# Patient Record
Sex: FEMALE | Race: WHITE | Employment: OTHER | URBAN - METROPOLITAN AREA
[De-identification: names, ages, dates, MRNs, and addresses within clinical notes are randomized per-mention and may not be internally consistent; named-entity substitution may affect disease eponyms.]

---

## 2024-08-28 ENCOUNTER — TELEPHONE (OUTPATIENT)
Dept: INTERNAL MEDICINE CLINIC | Age: 74
End: 2024-08-28

## 2024-08-28 ENCOUNTER — HOSPITAL ENCOUNTER (INPATIENT)
Age: 74
LOS: 3 days | Discharge: HOME | End: 2024-08-31
Attending: INTERNAL MEDICINE | Admitting: INTERNAL MEDICINE
Payer: MEDICARE

## 2024-08-28 DIAGNOSIS — R06.02 SHORTNESS OF BREATH: Primary | ICD-10-CM

## 2024-08-28 DIAGNOSIS — R07.9 CHEST PAIN: ICD-10-CM

## 2024-08-28 DIAGNOSIS — I42.8 NONISCHEMIC CARDIOMYOPATHY (HCC): ICD-10-CM

## 2024-08-28 DIAGNOSIS — I48.0 PAROXYSMAL ATRIAL FIBRILLATION (HCC): ICD-10-CM

## 2024-08-28 PROBLEM — J44.1 COPD EXACERBATION (HCC): Status: ACTIVE | Noted: 2024-08-28

## 2024-08-28 PROBLEM — I21.4 NSTEMI (NON-ST ELEVATED MYOCARDIAL INFARCTION) (HCC): Status: ACTIVE | Noted: 2024-08-28

## 2024-08-28 LAB
ANION GAP SERPL CALCULATED.3IONS-SCNC: 12 MMOL/L (ref 3–16)
ANTI-XA UNFRAC HEPARIN: 0.64 IU/ML (ref 0.3–0.7)
BUN SERPL-MCNC: 15 MG/DL (ref 7–20)
CALCIUM SERPL-MCNC: 9.2 MG/DL (ref 8.3–10.6)
CHLORIDE SERPL-SCNC: 95 MMOL/L (ref 99–110)
CO2 SERPL-SCNC: 25 MMOL/L (ref 21–32)
CREAT SERPL-MCNC: 0.8 MG/DL (ref 0.6–1.2)
DEPRECATED RDW RBC AUTO: 13.4 % (ref 12.4–15.4)
EKG ATRIAL RATE: 88 BPM
EKG DIAGNOSIS: NORMAL
EKG P AXIS: 76 DEGREES
EKG P-R INTERVAL: 142 MS
EKG Q-T INTERVAL: 428 MS
EKG QRS DURATION: 80 MS
EKG QTC CALCULATION (BAZETT): 517 MS
EKG R AXIS: 99 DEGREES
EKG T AXIS: 131 DEGREES
EKG VENTRICULAR RATE: 88 BPM
GFR SERPLBLD CREATININE-BSD FMLA CKD-EPI: 78 ML/MIN/{1.73_M2}
GLUCOSE SERPL-MCNC: 140 MG/DL (ref 70–99)
HCT VFR BLD AUTO: 42 % (ref 36–48)
HGB BLD-MCNC: 14.2 G/DL (ref 12–16)
MCH RBC QN AUTO: 32.1 PG (ref 26–34)
MCHC RBC AUTO-ENTMCNC: 33.7 G/DL (ref 31–36)
MCV RBC AUTO: 95.3 FL (ref 80–100)
NT-PROBNP SERPL-MCNC: ABNORMAL PG/ML (ref 0–124)
PLATELET # BLD AUTO: 294 K/UL (ref 135–450)
PMV BLD AUTO: 8.3 FL (ref 5–10.5)
POTASSIUM SERPL-SCNC: 3.9 MMOL/L (ref 3.5–5.1)
RBC # BLD AUTO: 4.41 M/UL (ref 4–5.2)
SODIUM SERPL-SCNC: 132 MMOL/L (ref 136–145)
TROPONIN, HIGH SENSITIVITY: 507 NG/L (ref 0–14)
TROPONIN, HIGH SENSITIVITY: 513 NG/L (ref 0–14)
WBC # BLD AUTO: 10.2 K/UL (ref 4–11)

## 2024-08-28 PROCEDURE — 6370000000 HC RX 637 (ALT 250 FOR IP): Performed by: NURSE PRACTITIONER

## 2024-08-28 PROCEDURE — 94640 AIRWAY INHALATION TREATMENT: CPT

## 2024-08-28 PROCEDURE — 83880 ASSAY OF NATRIURETIC PEPTIDE: CPT

## 2024-08-28 PROCEDURE — 36415 COLL VENOUS BLD VENIPUNCTURE: CPT

## 2024-08-28 PROCEDURE — 2580000003 HC RX 258: Performed by: INTERNAL MEDICINE

## 2024-08-28 PROCEDURE — 93010 ELECTROCARDIOGRAM REPORT: CPT | Performed by: INTERNAL MEDICINE

## 2024-08-28 PROCEDURE — 84484 ASSAY OF TROPONIN QUANT: CPT

## 2024-08-28 PROCEDURE — 6370000000 HC RX 637 (ALT 250 FOR IP): Performed by: INTERNAL MEDICINE

## 2024-08-28 PROCEDURE — 94761 N-INVAS EAR/PLS OXIMETRY MLT: CPT

## 2024-08-28 PROCEDURE — 85520 HEPARIN ASSAY: CPT

## 2024-08-28 PROCEDURE — 6360000002 HC RX W HCPCS: Performed by: INTERNAL MEDICINE

## 2024-08-28 PROCEDURE — 93005 ELECTROCARDIOGRAM TRACING: CPT | Performed by: INTERNAL MEDICINE

## 2024-08-28 PROCEDURE — 2700000000 HC OXYGEN THERAPY PER DAY

## 2024-08-28 PROCEDURE — 80048 BASIC METABOLIC PNL TOTAL CA: CPT

## 2024-08-28 PROCEDURE — 1200000000 HC SEMI PRIVATE

## 2024-08-28 PROCEDURE — 85027 COMPLETE CBC AUTOMATED: CPT

## 2024-08-28 RX ORDER — SODIUM CHLORIDE 0.9 % (FLUSH) 0.9 %
5-40 SYRINGE (ML) INJECTION PRN
Status: DISCONTINUED | OUTPATIENT
Start: 2024-08-28 | End: 2024-08-31 | Stop reason: HOSPADM

## 2024-08-28 RX ORDER — HEPARIN SODIUM 1000 [USP'U]/ML
60 INJECTION, SOLUTION INTRAVENOUS; SUBCUTANEOUS PRN
Status: DISCONTINUED | OUTPATIENT
Start: 2024-08-28 | End: 2024-08-29

## 2024-08-28 RX ORDER — ACETAMINOPHEN 325 MG/1
650 TABLET ORAL EVERY 6 HOURS PRN
Status: DISCONTINUED | OUTPATIENT
Start: 2024-08-28 | End: 2024-08-31 | Stop reason: HOSPADM

## 2024-08-28 RX ORDER — IPRATROPIUM BROMIDE AND ALBUTEROL SULFATE 2.5; .5 MG/3ML; MG/3ML
1 SOLUTION RESPIRATORY (INHALATION)
Status: DISCONTINUED | OUTPATIENT
Start: 2024-08-28 | End: 2024-08-31 | Stop reason: HOSPADM

## 2024-08-28 RX ORDER — SODIUM CHLORIDE 9 MG/ML
INJECTION, SOLUTION INTRAVENOUS PRN
Status: DISCONTINUED | OUTPATIENT
Start: 2024-08-28 | End: 2024-08-31 | Stop reason: HOSPADM

## 2024-08-28 RX ORDER — HEPARIN SODIUM 10000 [USP'U]/100ML
650 INJECTION, SOLUTION INTRAVENOUS CONTINUOUS
Status: DISCONTINUED | OUTPATIENT
Start: 2024-08-28 | End: 2024-08-29

## 2024-08-28 RX ORDER — HEPARIN SODIUM 1000 [USP'U]/ML
30 INJECTION, SOLUTION INTRAVENOUS; SUBCUTANEOUS PRN
Status: DISCONTINUED | OUTPATIENT
Start: 2024-08-28 | End: 2024-08-29

## 2024-08-28 RX ORDER — ONDANSETRON 2 MG/ML
4 INJECTION INTRAMUSCULAR; INTRAVENOUS EVERY 6 HOURS PRN
Status: DISCONTINUED | OUTPATIENT
Start: 2024-08-28 | End: 2024-08-28

## 2024-08-28 RX ORDER — PROCHLORPERAZINE EDISYLATE 5 MG/ML
10 INJECTION INTRAMUSCULAR; INTRAVENOUS EVERY 6 HOURS PRN
Status: DISCONTINUED | OUTPATIENT
Start: 2024-08-28 | End: 2024-08-31 | Stop reason: HOSPADM

## 2024-08-28 RX ORDER — ONDANSETRON 4 MG/1
4 TABLET, ORALLY DISINTEGRATING ORAL EVERY 8 HOURS PRN
Status: DISCONTINUED | OUTPATIENT
Start: 2024-08-28 | End: 2024-08-28

## 2024-08-28 RX ORDER — ATORVASTATIN CALCIUM 80 MG/1
80 TABLET, FILM COATED ORAL NIGHTLY
Status: DISCONTINUED | OUTPATIENT
Start: 2024-08-28 | End: 2024-08-31 | Stop reason: HOSPADM

## 2024-08-28 RX ORDER — SODIUM CHLORIDE 0.9 % (FLUSH) 0.9 %
5-40 SYRINGE (ML) INJECTION EVERY 12 HOURS SCHEDULED
Status: DISCONTINUED | OUTPATIENT
Start: 2024-08-28 | End: 2024-08-31 | Stop reason: HOSPADM

## 2024-08-28 RX ORDER — ACETAMINOPHEN 650 MG/1
650 SUPPOSITORY RECTAL EVERY 6 HOURS PRN
Status: DISCONTINUED | OUTPATIENT
Start: 2024-08-28 | End: 2024-08-31 | Stop reason: HOSPADM

## 2024-08-28 RX ORDER — ASPIRIN 81 MG/1
81 TABLET, CHEWABLE ORAL DAILY
Status: DISCONTINUED | OUTPATIENT
Start: 2024-08-29 | End: 2024-08-31 | Stop reason: HOSPADM

## 2024-08-28 RX ORDER — POLYETHYLENE GLYCOL 3350 17 G/17G
17 POWDER, FOR SOLUTION ORAL DAILY PRN
Status: DISCONTINUED | OUTPATIENT
Start: 2024-08-28 | End: 2024-08-31 | Stop reason: HOSPADM

## 2024-08-28 RX ORDER — CYCLOBENZAPRINE HCL 10 MG
10 TABLET ORAL ONCE
Status: COMPLETED | OUTPATIENT
Start: 2024-08-29 | End: 2024-08-28

## 2024-08-28 RX ADMIN — IPRATROPIUM BROMIDE AND ALBUTEROL SULFATE 1 DOSE: 2.5; .5 SOLUTION RESPIRATORY (INHALATION) at 19:45

## 2024-08-28 RX ADMIN — Medication 10 ML: at 19:42

## 2024-08-28 RX ADMIN — ATORVASTATIN CALCIUM 80 MG: 80 TABLET, FILM COATED ORAL at 19:42

## 2024-08-28 RX ADMIN — HEPARIN SODIUM 560 UNITS/HR: 10000 INJECTION, SOLUTION INTRAVENOUS at 20:27

## 2024-08-28 RX ADMIN — CYCLOBENZAPRINE 10 MG: 10 TABLET, FILM COATED ORAL at 23:51

## 2024-08-28 NOTE — TELEPHONE ENCOUNTER
----- Message from Dr. Andi Serna MD sent at 8/28/2024  8:58 AM EDT -----  Contact: [phone]  Chest xray pa,lat   Shortness of breath  ----- Message -----  From: Jordana Velasquez  Sent: 8/28/2024   8:33 AM EDT  To: Andi Serna MD    Patient called life squad this morning do being unable to breath. Pt over exerted herself yesterday walking outside. Life squad told her she will need oxygen and  xray's. Patient called office while stacey was there asking to come into office today instead of being taken to the ER. I spoke with stacey,she has shortness of breath 96% there were doing a breathing treatment and offered to take her to the ER. I did make a future appt for patient.

## 2024-08-28 NOTE — CONSULTS
Consult Placed Left Message    Who: [facility]  Date: 08/28/24  Time: 1923     Electronically signed by Marisel Eid RN on 8/28/2024 at 7:22 PM

## 2024-08-28 NOTE — H&P
Hospital Medicine History & Physical      Date of Admission: 8/28/2024    Date of Service:  Pt seen/examined on 8/28/2024     [x]Admitted to Inpatient with expected LOS greater than two midnights due to medical therapy.  []Placed in Observation status.    Chief Admission Complaint:  SOB    Presenting Admission History:      73 y.o. female who presented to [facility] with SOB.  PMHx significant for COPD. She reports symptoms started on 8/27 while at [facility] for the [facility].  She had abrupt onset of SOB and MORSE. She was taken to the medical tent, provided some supplemental O2 and symptoms gradually improved. The next morning, she began to experience worsening symptoms again. She denies any chest pain but notes some discomfort in her left neck.  She was initially evaluated at [facility] ED. EKG was stable. CXR clear. Troponins (TnI) were elevated at 3. D-dimer and BNP elevated. WBC 19K. CTPE was negative for PE or Pneumonia but showed an 11.8 mm ANATOLY Pulmonary nodule and trace right pleural effusion, bronchiectasis but no definite infiltrate. There was concern for NSTEMI and possible COPD Exacerbation. She was started on Heparin drip, Levaquin, Solumedrol and Duonebs. She was transferred to [facility] for further evaluation.     Assessment/Plan:      NSTEMI: Symptoms somewhat atypical (SOB, MORSE, left neck pain) but Troponins are significantly elevated. Continue serial troponins. Continue Heparin drip. Cardiology consulted.     Possible COPD Exacerbation: Received dose of Levaquin and Solumedrol in ED. No evidence for Pneumonia. Continue Duonebs but suspicion low enough to hold off on further Abx and steroids pending course.     Leukocytosis: 19K this morning at [facility]. Steroids were given afterwards. WBC appears normal at 10K upon transfer. Suspect this could be stress response.      Pulmonary Nodule: 11.8 mm ANATOLY Pulmonary nodule, will need ongoing follow-up.       Discussed management and the need  Provider   fluticasone-umeclidin-vilant (TRELEGY ELLIPTA) 200-62.5-25 MCG/ACT AEPB inhaler INHALE 1 PUFF ONCE DAILY 6/3/24  Yes Andi Serna MD   albuterol sulfate HFA (PROVENTIL;VENTOLIN;PROAIR) 108 (90 Base) MCG/ACT inhaler INHALE 2 PUFFS BY MOUTH EVERY 6 HOURS AS NEEDED FOR WHEEZING 11/29/23  Yes Andi Serna MD       Labs: Personally reviewed and interpreted for clinical significance.   No results for input(s): \"WBC\", \"HGB\", \"HCT\", \"PLT\" in the last 72 hours.  No results for input(s): \"NA\", \"K\", \"CL\", \"CO2\", \"BUN\", \"CREATININE\", \"CALCIUM\", \"MG\", \"PHOS\" in the last 72 hours.  No results for input(s): \"PROBNP\", \"TROPHS\" in the last 72 hours.  No results for input(s): \"LABA1C\" in the last 72 hours.  No results for input(s): \"AST\", \"ALT\", \"BILIDIR\", \"BILITOT\", \"ALKPHOS\" in the last 72 hours.  No results for input(s): \"INR\", \"LACTA\", \"TSH\" in the last 72 hours.     Ravi Hand MD

## 2024-08-28 NOTE — PROGRESS NOTES
4 Eyes Skin Assessment     NAME:  Vero \"Kelly\"Augusto  YOB: 1950  MEDICAL RECORD NUMBER:  [id number]    The patient is being assessed for  Admission    I agree that at least one RN has performed a thorough Head to Toe Skin Assessment on the patient. ALL assessment sites listed below have been assessed.      Areas assessed by both nurses:    Head, Face, Ears, Shoulders, Back, Chest, Arms, Elbows, Hands, Sacrum. Buttock, Coccyx, Ischium, Legs. Feet and Heels, and Under Medical Devices         Does the Patient have a Wound? No noted wound(s)       Alcon Prevention initiated by RN: No  Wound Care Orders initiated by RN: No    Pressure Injury (Stage 3,4, Unstageable, DTI, NWPT, and Complex wounds) if present, place Wound referral order by RN under : No    New Ostomies, if present place, Ostomy referral order under : No     Nurse 1 eSignature: Electronically signed by Shruthi Montero RN on 8/28/24 at 6:32 PM EDT    **SHARE this note so that the co-signing nurse can place an eSignature**    Nurse 2 eSignature: {Esignature:[id number]}

## 2024-08-28 NOTE — CONSULTS
Pharmacy to manage Heparin - contact for questions.    Heparin 60 units/kg IV x 1 (max 4,000 units), then 12 units/kg/hr (recommended max initial rate 1,000 units/hr). Adjust infusion rate based off anti-Xa results below.     anti-Xa < 0.1    Heparin 60 units/kg bolus  Increase infusion by 4 units/kg/hr  anti-Xa 0.1-0.29 Heparin 30 units/kg bolus Increase infusion by 2 units/kg/hr  anti-Xa 0.3-0.7    No bolus No change   anti-Xa 0.71-0.8   No bolus Decrease infusion by 1 units/kg/hr  anti-Xa 0.81-0.99    No bolus Decrease infusion by 2 units/kg/hr  anti-Xa 1 or more     Hold heparin for 1 hour Decrease infusion by 3 units/kg/hr    Obtain anti-Xa 6 hours after bolus and 6 hours after any dose change until two consecutive therapeutic anti-Xa are achieved- then daily.   Patient came from [facility] ED where she was started on heparin, per Dr Yazan galeana had been off a few hours.  AntiXa drawn level 0.64 @ 1925.  Drip started @ 560 units/hour.  Hebert Duque, PharmD 8/28/2024 7:57 PM    8/29/2024 at 0250  Anti-Xa Unfrac Heparin   0.21   IU/mL  - Heparin _1400_ units IVP bolus and then increase Heparin infusion to _650_ units/hr.   - Recheck Anti-Xa in 6 hours at 1000  Bony Pepper, Pharm D.8/29/2024 3:42 AM      8/29/2024  anti-Xa level = 0.41 IU/mL at 0951  Continue Heparin infusion rate at 650 units/hr   Recheck level in 6 hours at 1600  Neela Romero, PharmD  8/29/2024 10:16 AM

## 2024-08-29 ENCOUNTER — APPOINTMENT (OUTPATIENT)
Age: 74
End: 2024-08-29
Attending: INTERNAL MEDICINE
Payer: MEDICARE

## 2024-08-29 LAB
ANION GAP SERPL CALCULATED.3IONS-SCNC: 11 MMOL/L (ref 3–16)
ANTI-XA UNFRAC HEPARIN: 0.21 IU/ML (ref 0.3–0.7)
ANTI-XA UNFRAC HEPARIN: 0.41 IU/ML (ref 0.3–0.7)
BUN SERPL-MCNC: 17 MG/DL (ref 7–20)
CALCIUM SERPL-MCNC: 9.1 MG/DL (ref 8.3–10.6)
CHLORIDE SERPL-SCNC: 98 MMOL/L (ref 99–110)
CO2 SERPL-SCNC: 27 MMOL/L (ref 21–32)
CREAT SERPL-MCNC: 0.8 MG/DL (ref 0.6–1.2)
DEPRECATED RDW RBC AUTO: 13.4 % (ref 12.4–15.4)
ECHO AO ROOT DIAM: 2.7 CM
ECHO AO ROOT INDEX: 1.84 CM/M2
ECHO AV AREA PEAK VELOCITY: 2.7 CM2
ECHO AV AREA/BSA PEAK VELOCITY: 1.8 CM2/M2
ECHO AV PEAK GRADIENT: 5 MMHG
ECHO AV PEAK VELOCITY: 1.1 M/S
ECHO AV VELOCITY RATIO: 1
ECHO BSA: 1.46 M2
ECHO BSA: 1.46 M2
ECHO EST RA PRESSURE: 3 MMHG
ECHO IVC PROX: 1.4 CM
ECHO LA AREA 2C: 14.9 CM2
ECHO LA AREA 4C: 16.2 CM2
ECHO LA DIAMETER INDEX: 2.31 CM/M2
ECHO LA DIAMETER: 3.4 CM
ECHO LA MAJOR AXIS: 5 CM
ECHO LA MINOR AXIS: 5.1 CM
ECHO LA TO AORTIC ROOT RATIO: 1.26
ECHO LA VOL BP: 39 ML (ref 22–52)
ECHO LA VOL MOD A2C: 36 ML (ref 22–52)
ECHO LA VOL MOD A4C: 42 ML (ref 22–52)
ECHO LA VOL/BSA BIPLANE: 27 ML/M2 (ref 16–34)
ECHO LA VOLUME INDEX MOD A2C: 24 ML/M2 (ref 16–34)
ECHO LA VOLUME INDEX MOD A4C: 29 ML/M2 (ref 16–34)
ECHO LV E' LATERAL VELOCITY: 8 CM/S
ECHO LV E' SEPTAL VELOCITY: 7 CM/S
ECHO LV EDV A2C: 58 ML
ECHO LV EDV A4C: 74 ML
ECHO LV EDV INDEX A4C: 50 ML/M2
ECHO LV EDV NDEX A2C: 39 ML/M2
ECHO LV EF PHYSICIAN: 25 %
ECHO LV EJECTION FRACTION A2C: 37 %
ECHO LV EJECTION FRACTION A4C: 30 %
ECHO LV EJECTION FRACTION BIPLANE: 33 % (ref 55–100)
ECHO LV ESV A2C: 37 ML
ECHO LV ESV A4C: 52 ML
ECHO LV ESV INDEX A2C: 25 ML/M2
ECHO LV ESV INDEX A4C: 35 ML/M2
ECHO LV FRACTIONAL SHORTENING: 21 % (ref 28–44)
ECHO LV GLOBAL LONGITUDINAL STRAIN (GLS): -9.1 %
ECHO LV INTERNAL DIMENSION DIASTOLE INDEX: 2.65 CM/M2
ECHO LV INTERNAL DIMENSION DIASTOLIC: 3.9 CM (ref 3.9–5.3)
ECHO LV INTERNAL DIMENSION SYSTOLIC INDEX: 2.11 CM/M2
ECHO LV INTERNAL DIMENSION SYSTOLIC: 3.1 CM
ECHO LV ISOVOLUMETRIC RELAXATION TIME (IVRT): 82 MS
ECHO LV IVSD: 0.7 CM (ref 0.6–0.9)
ECHO LV MASS 2D: 75.1 G (ref 67–162)
ECHO LV MASS INDEX 2D: 51.1 G/M2 (ref 43–95)
ECHO LV POSTERIOR WALL DIASTOLIC: 0.7 CM (ref 0.6–0.9)
ECHO LV RELATIVE WALL THICKNESS RATIO: 0.36
ECHO LVOT AREA: 2.8 CM2
ECHO LVOT DIAM: 1.9 CM
ECHO LVOT MEAN GRADIENT: 2 MMHG
ECHO LVOT PEAK GRADIENT: 5 MMHG
ECHO LVOT PEAK VELOCITY: 1.1 M/S
ECHO LVOT STROKE VOLUME INDEX: 38.4 ML/M2
ECHO LVOT SV: 56.4 ML
ECHO LVOT VTI: 19.9 CM
ECHO MV A VELOCITY: 0.79 M/S
ECHO MV E DECELERATION TIME (DT): 174 MS
ECHO MV E VELOCITY: 0.87 M/S
ECHO MV E/A RATIO: 1.1
ECHO MV E/E' LATERAL: 10.88
ECHO MV E/E' RATIO (AVERAGED): 11.65
ECHO MV E/E' SEPTAL: 12.43
ECHO RA AREA 4C: 10.5 CM2
ECHO RA END SYSTOLIC VOLUME APICAL 4 CHAMBER INDEX BSA: 18 ML/M2
ECHO RA VOLUME: 26 ML
ECHO RV FREE WALL PEAK S': 11 CM/S
ECHO RV TAPSE: 1.8 CM (ref 1.7–?)
EKG ATRIAL RATE: 78 BPM
EKG DIAGNOSIS: NORMAL
EKG P AXIS: 79 DEGREES
EKG P-R INTERVAL: 134 MS
EKG Q-T INTERVAL: 460 MS
EKG QRS DURATION: 82 MS
EKG QTC CALCULATION (BAZETT): 524 MS
EKG R AXIS: 114 DEGREES
EKG T AXIS: 229 DEGREES
EKG VENTRICULAR RATE: 78 BPM
GFR SERPLBLD CREATININE-BSD FMLA CKD-EPI: 78 ML/MIN/{1.73_M2}
GLUCOSE SERPL-MCNC: 126 MG/DL (ref 70–99)
HCT VFR BLD AUTO: 39.7 % (ref 36–48)
HGB BLD-MCNC: 13.3 G/DL (ref 12–16)
MCH RBC QN AUTO: 31.7 PG (ref 26–34)
MCHC RBC AUTO-ENTMCNC: 33.5 G/DL (ref 31–36)
MCV RBC AUTO: 94.6 FL (ref 80–100)
PLATELET # BLD AUTO: 266 K/UL (ref 135–450)
PMV BLD AUTO: 8.5 FL (ref 5–10.5)
POC ACT LR: 290 SEC
POTASSIUM SERPL-SCNC: 4.1 MMOL/L (ref 3.5–5.1)
RBC # BLD AUTO: 4.19 M/UL (ref 4–5.2)
SODIUM SERPL-SCNC: 136 MMOL/L (ref 136–145)
WBC # BLD AUTO: 15.9 K/UL (ref 4–11)

## 2024-08-29 PROCEDURE — 94761 N-INVAS EAR/PLS OXIMETRY MLT: CPT

## 2024-08-29 PROCEDURE — 93306 TTE W/DOPPLER COMPLETE: CPT | Performed by: INTERNAL MEDICINE

## 2024-08-29 PROCEDURE — 4A023N7 MEASUREMENT OF CARDIAC SAMPLING AND PRESSURE, LEFT HEART, PERCUTANEOUS APPROACH: ICD-10-PCS | Performed by: INTERNAL MEDICINE

## 2024-08-29 PROCEDURE — 85027 COMPLETE CBC AUTOMATED: CPT

## 2024-08-29 PROCEDURE — 93005 ELECTROCARDIOGRAM TRACING: CPT | Performed by: INTERNAL MEDICINE

## 2024-08-29 PROCEDURE — 99223 1ST HOSP IP/OBS HIGH 75: CPT | Performed by: INTERNAL MEDICINE

## 2024-08-29 PROCEDURE — 2580000003 HC RX 258: Performed by: INTERNAL MEDICINE

## 2024-08-29 PROCEDURE — 85520 HEPARIN ASSAY: CPT

## 2024-08-29 PROCEDURE — 93010 ELECTROCARDIOGRAM REPORT: CPT | Performed by: INTERNAL MEDICINE

## 2024-08-29 PROCEDURE — C1894 INTRO/SHEATH, NON-LASER: HCPCS | Performed by: INTERNAL MEDICINE

## 2024-08-29 PROCEDURE — 80048 BASIC METABOLIC PNL TOTAL CA: CPT

## 2024-08-29 PROCEDURE — 6360000002 HC RX W HCPCS: Performed by: INTERNAL MEDICINE

## 2024-08-29 PROCEDURE — C1769 GUIDE WIRE: HCPCS | Performed by: INTERNAL MEDICINE

## 2024-08-29 PROCEDURE — 99152 MOD SED SAME PHYS/QHP 5/>YRS: CPT | Performed by: INTERNAL MEDICINE

## 2024-08-29 PROCEDURE — 6370000000 HC RX 637 (ALT 250 FOR IP): Performed by: INTERNAL MEDICINE

## 2024-08-29 PROCEDURE — 85347 COAGULATION TIME ACTIVATED: CPT

## 2024-08-29 PROCEDURE — 94640 AIRWAY INHALATION TREATMENT: CPT

## 2024-08-29 PROCEDURE — 93458 L HRT ARTERY/VENTRICLE ANGIO: CPT | Performed by: INTERNAL MEDICINE

## 2024-08-29 PROCEDURE — 76376 3D RENDER W/INTRP POSTPROCES: CPT

## 2024-08-29 PROCEDURE — B2111ZZ FLUOROSCOPY OF MULTIPLE CORONARY ARTERIES USING LOW OSMOLAR CONTRAST: ICD-10-PCS | Performed by: INTERNAL MEDICINE

## 2024-08-29 PROCEDURE — 76376 3D RENDER W/INTRP POSTPROCES: CPT | Performed by: INTERNAL MEDICINE

## 2024-08-29 PROCEDURE — 2700000000 HC OXYGEN THERAPY PER DAY

## 2024-08-29 PROCEDURE — 2709999900 HC NON-CHARGEABLE SUPPLY: Performed by: INTERNAL MEDICINE

## 2024-08-29 PROCEDURE — 2500000003 HC RX 250 WO HCPCS: Performed by: INTERNAL MEDICINE

## 2024-08-29 PROCEDURE — 93356 MYOCRD STRAIN IMG SPCKL TRCK: CPT

## 2024-08-29 PROCEDURE — B2151ZZ FLUOROSCOPY OF LEFT HEART USING LOW OSMOLAR CONTRAST: ICD-10-PCS | Performed by: INTERNAL MEDICINE

## 2024-08-29 PROCEDURE — 6360000004 HC RX CONTRAST MEDICATION: Performed by: INTERNAL MEDICINE

## 2024-08-29 PROCEDURE — 1200000000 HC SEMI PRIVATE

## 2024-08-29 PROCEDURE — 36415 COLL VENOUS BLD VENIPUNCTURE: CPT

## 2024-08-29 PROCEDURE — 93356 MYOCRD STRAIN IMG SPCKL TRCK: CPT | Performed by: INTERNAL MEDICINE

## 2024-08-29 RX ORDER — SODIUM CHLORIDE 9 MG/ML
INJECTION, SOLUTION INTRAVENOUS PRN
Status: DISCONTINUED | OUTPATIENT
Start: 2024-08-29 | End: 2024-08-29 | Stop reason: HOSPADM

## 2024-08-29 RX ORDER — SODIUM CHLORIDE 0.9 % (FLUSH) 0.9 %
5-40 SYRINGE (ML) INJECTION EVERY 12 HOURS SCHEDULED
Status: DISCONTINUED | OUTPATIENT
Start: 2024-08-29 | End: 2024-08-29 | Stop reason: HOSPADM

## 2024-08-29 RX ORDER — IOPAMIDOL 755 MG/ML
INJECTION, SOLUTION INTRAVASCULAR PRN
Status: DISCONTINUED | OUTPATIENT
Start: 2024-08-29 | End: 2024-08-29 | Stop reason: HOSPADM

## 2024-08-29 RX ORDER — ACETAMINOPHEN 325 MG/1
650 TABLET ORAL EVERY 4 HOURS PRN
Status: DISCONTINUED | OUTPATIENT
Start: 2024-08-29 | End: 2024-08-31 | Stop reason: HOSPADM

## 2024-08-29 RX ORDER — HEPARIN SODIUM 1000 [USP'U]/ML
1400 INJECTION, SOLUTION INTRAVENOUS; SUBCUTANEOUS ONCE
Status: COMPLETED | OUTPATIENT
Start: 2024-08-29 | End: 2024-08-29

## 2024-08-29 RX ORDER — SODIUM CHLORIDE 0.9 % (FLUSH) 0.9 %
5-40 SYRINGE (ML) INJECTION PRN
Status: DISCONTINUED | OUTPATIENT
Start: 2024-08-29 | End: 2024-08-31 | Stop reason: HOSPADM

## 2024-08-29 RX ORDER — MIDAZOLAM HYDROCHLORIDE 1 MG/ML
INJECTION INTRAMUSCULAR; INTRAVENOUS PRN
Status: DISCONTINUED | OUTPATIENT
Start: 2024-08-29 | End: 2024-08-29 | Stop reason: HOSPADM

## 2024-08-29 RX ORDER — SODIUM CHLORIDE 9 MG/ML
INJECTION, SOLUTION INTRAVENOUS PRN
Status: DISCONTINUED | OUTPATIENT
Start: 2024-08-29 | End: 2024-08-31 | Stop reason: HOSPADM

## 2024-08-29 RX ORDER — HEPARIN SODIUM 1000 [USP'U]/ML
INJECTION, SOLUTION INTRAVENOUS; SUBCUTANEOUS PRN
Status: DISCONTINUED | OUTPATIENT
Start: 2024-08-29 | End: 2024-08-29 | Stop reason: HOSPADM

## 2024-08-29 RX ORDER — CARVEDILOL 3.12 MG/1
3.12 TABLET ORAL 2 TIMES DAILY WITH MEALS
Status: DISCONTINUED | OUTPATIENT
Start: 2024-08-29 | End: 2024-08-31 | Stop reason: HOSPADM

## 2024-08-29 RX ORDER — ASPIRIN 325 MG
325 TABLET ORAL ONCE
Status: COMPLETED | OUTPATIENT
Start: 2024-08-29 | End: 2024-08-29

## 2024-08-29 RX ORDER — SODIUM CHLORIDE 0.9 % (FLUSH) 0.9 %
5-40 SYRINGE (ML) INJECTION EVERY 12 HOURS SCHEDULED
Status: DISCONTINUED | OUTPATIENT
Start: 2024-08-29 | End: 2024-08-29

## 2024-08-29 RX ORDER — LISINOPRIL 2.5 MG/1
2.5 TABLET ORAL DAILY
Status: DISCONTINUED | OUTPATIENT
Start: 2024-08-29 | End: 2024-08-29

## 2024-08-29 RX ORDER — ONDANSETRON 2 MG/ML
4 INJECTION INTRAMUSCULAR; INTRAVENOUS EVERY 6 HOURS PRN
Status: DISCONTINUED | OUTPATIENT
Start: 2024-08-29 | End: 2024-08-29 | Stop reason: HOSPADM

## 2024-08-29 RX ORDER — FENTANYL CITRATE 50 UG/ML
INJECTION, SOLUTION INTRAMUSCULAR; INTRAVENOUS PRN
Status: DISCONTINUED | OUTPATIENT
Start: 2024-08-29 | End: 2024-08-29 | Stop reason: HOSPADM

## 2024-08-29 RX ORDER — NICARDIPINE HYDROCHLORIDE 2.5 MG/ML
INJECTION INTRAVENOUS PRN
Status: DISCONTINUED | OUTPATIENT
Start: 2024-08-29 | End: 2024-08-29 | Stop reason: HOSPADM

## 2024-08-29 RX ORDER — LISINOPRIL 5 MG/1
2.5 TABLET ORAL DAILY
Status: DISCONTINUED | OUTPATIENT
Start: 2024-08-29 | End: 2024-08-31 | Stop reason: HOSPADM

## 2024-08-29 RX ORDER — CARVEDILOL 6.25 MG/1
3.12 TABLET ORAL 2 TIMES DAILY WITH MEALS
Status: DISCONTINUED | OUTPATIENT
Start: 2024-08-29 | End: 2024-08-29

## 2024-08-29 RX ORDER — SODIUM CHLORIDE 0.9 % (FLUSH) 0.9 %
5-40 SYRINGE (ML) INJECTION PRN
Status: DISCONTINUED | OUTPATIENT
Start: 2024-08-29 | End: 2024-08-29 | Stop reason: HOSPADM

## 2024-08-29 RX ORDER — LORAZEPAM 0.5 MG/1
0.5 TABLET ORAL
Status: DISCONTINUED | OUTPATIENT
Start: 2024-08-29 | End: 2024-08-29 | Stop reason: HOSPADM

## 2024-08-29 RX ADMIN — IPRATROPIUM BROMIDE AND ALBUTEROL SULFATE 1 DOSE: 2.5; .5 SOLUTION RESPIRATORY (INHALATION) at 07:47

## 2024-08-29 RX ADMIN — CARVEDILOL 3.12 MG: 3.12 TABLET, FILM COATED ORAL at 18:37

## 2024-08-29 RX ADMIN — ATORVASTATIN CALCIUM 80 MG: 80 TABLET, FILM COATED ORAL at 21:32

## 2024-08-29 RX ADMIN — LISINOPRIL 2.5 MG: 2.5 TABLET ORAL at 14:06

## 2024-08-29 RX ADMIN — HEPARIN SODIUM 1400 UNITS: 1000 INJECTION INTRAVENOUS; SUBCUTANEOUS at 04:00

## 2024-08-29 RX ADMIN — IPRATROPIUM BROMIDE AND ALBUTEROL SULFATE 1 DOSE: 2.5; .5 SOLUTION RESPIRATORY (INHALATION) at 21:16

## 2024-08-29 RX ADMIN — Medication 10 ML: at 21:32

## 2024-08-29 RX ADMIN — HEPARIN SODIUM 1400 UNITS: 1000 INJECTION INTRAVENOUS; SUBCUTANEOUS at 03:59

## 2024-08-29 RX ADMIN — Medication 10 ML: at 10:59

## 2024-08-29 RX ADMIN — ASPIRIN 325 MG: 325 TABLET ORAL at 09:40

## 2024-08-29 NOTE — CARE COORDINATION
Case Management Assessment  Initial Evaluation    Date/Time of Evaluation: 8/29/2024 9:58 AM  Assessment Completed by: Lluvia Mishra RN    If patient is discharged prior to next notation, then this note serves as note for discharge by case management.    Patient Name: Vero \"Kelly\"Augusto                   YOB: 1950  Diagnosis: NSTEMI (non-ST elevated myocardial infarction) (HCC) [I21.4]  COPD exacerbation (HCC) [J44.1]                   Date / Time: 8/28/2024  5:53 PM    Patient Admission Status: Inpatient   Readmission Risk (Low < 19, Mod (19-27), High > 27): Readmission Risk Score: 4.6    Current PCP: Andi Serna MD  PCP verified by CM? Yes    Chart Reviewed: Yes      History Provided by: Patient  Patient Orientation: Alert and Oriented, Person, Place, Situation    Patient Cognition: Alert    Hospitalization in the last 30 days (Readmission):  No    If yes, Readmission Assessment in CM Navigator will be completed.    Advance Directives:      Code Status: Full Code   Patient's Primary Decision Maker is: Legal Next of Kin      Discharge Planning:    Patient lives with: Spouse/Significant Other Type of Home: Trailer/Mobile Home  Primary Care Giver: Self  Patient Support Systems include: Spouse/Significant Other   Current Financial resources: Other (Comment) (commercial)  Current community resources: None  Current services prior to admission: None            Current DME:              Type of Home Care services:  None    ADLS  Prior functional level: Independent in ADLs/IADLs  Current functional level: Independent in ADLs/IADLs    PT AM-PAC:   /24  OT AM-PAC:   /24    Family can provide assistance at DC: Yes  Would you like Case Management to discuss the discharge plan with any other family members/significant others, and if so, who? Yes ()  Plans to Return to Present Housing: Yes  Other Identified Issues/Barriers to RETURNING to current housing:   Potential Assistance

## 2024-08-29 NOTE — PROGRESS NOTES
Brief Pre-Op Note/Sedation Assessment      Vero \"Kelly\" Augusto  1950  [id number]  8:56 AM    Planned Procedure: Cardiac Catheterization Procedure  Post Procedure Plan: Return to same level of care  Consent: I have discussed with the patient and/or the patient representative the indication, alternatives, and the possible risks and/or complications of the planned procedure and the anesthesia methods. The patient and/or patient representative appear to understand and agree to proceed.    DISCUSSION OF CARDIAC CATHETERIZATION PROCEDURES: The procedures, indications, risks and alternatives have been discussed with the patient and, as appropriate, with the patient's guardian . Risks discussed included, but are not limited to, bleeding, development of blood clots/emboli, damage to blood vessels, renal failure, malignant cardiac arrhythmias, stroke, heart attack, emergent coronary bypass surgery, death, dye allergy.  The patient (and guardian as appropriate) expressed understanding of the aforementioned and wished to proceed.        Chief Complaint:   NSTEMI  Dyspnea      Indications for Cath Procedure:  Presentation:  ACS > 24 hrs  2.  Anginal Classification within 2 weeks:  CCS IV - Inability to perform any activity without angina or angina at rest, i.e., severe limitation  3.  Angina Symptoms Assessment:  Atypical Chest Pain  4.  Heart Failure Class within last 2 weeks:  Yes:  Heart Failure Type: Systolic Severity:  Class IV - Symptoms of HF at rest  5.  Cardiovascular Instability:  No    Prior Ischemic Workup/Eval:  Pre-Procedural Medications: Yes: Aspirin and STATIN  2.   Stress Test Completed?  No    Does Patient need surgery?  Cath Valve Surgery:  No    Pre-Procedure Medical History:  Vital Signs:  /67   Pulse 89   Temp 97.8 °F (36.6 °C) (Oral)   Resp 18   Ht 1.626 m (5' 4\")   Wt 46.2 kg (101 lb 14.4 oz)   SpO2 98%   BMI 17.49 kg/m²     Allergies:  No Known Allergies  Medications:     Current Facility-Administered Medications   Medication Dose Route Frequency Provider Last Rate Last Admin    sodium chloride flush 0.9 % injection 5-40 mL  5-40 mL IntraVENous 2 times per day Ravi Hand MD   10 mL at 08/28/24 1942    sodium chloride flush 0.9 % injection 5-40 mL  5-40 mL IntraVENous PRN Ravi Hand MD        0.9 % sodium chloride infusion   IntraVENous PRN Ravi Hand MD        acetaminophen (TYLENOL) tablet 650 mg  650 mg Oral Q6H PRN Ravi Hand MD        Or    acetaminophen (TYLENOL) suppository 650 mg  650 mg Rectal Q6H PRN Ravi Hand MD        polyethylene glycol (GLYCOLAX) packet 17 g  17 g Oral Daily PRN Ravi Hand MD        aspirin chewable tablet 81 mg  81 mg Oral Daily Ravi Hand MD        atorvastatin (LIPITOR) tablet 80 mg  80 mg Oral Nightly Ravi Hand MD   80 mg at 08/28/24 1942    ipratropium 0.5 mg-albuterol 2.5 mg (DUONEB) nebulizer solution 1 Dose  1 Dose Inhalation Q4H WA RT Ravi Hand MD   1 Dose at 08/29/24 0747    prochlorperazine (COMPAZINE) injection 10 mg  10 mg IntraVENous Q6H PRN Abbi Boateng APRN - CNP        heparin (porcine) injection 2,800 Units  60 Units/kg IntraVENous PRN Ravi Hand MD        heparin (porcine) injection 1,400 Units  30 Units/kg IntraVENous PRN Ravi Hand MD   1,400 Units at 08/29/24 0359    heparin 25,000 units in dextrose 5% 250 mL (premix) infusion  650 Units/hr IntraVENous Continuous Marilia Chiu MD 6.5 mL/hr at 08/29/24 0400 650 Units/hr at 08/29/24 0400       Past Medical History:    Past Medical History:   Diagnosis Date    COPD (chronic obstructive pulmonary disease) (HCC)     Streptococcus pneumoniae 10/23/2017    blood       Surgical History:  No past surgical history on file.          Pre-Sedation:  Pre-Sedation Documentation and Exam:  I have assessed the patient and reviewed the H&P on the chart.    Prior History of Anesthesia Complications:

## 2024-08-29 NOTE — PROGRESS NOTES
Physician Progress Note      PATIENT:               JE LANE \"BECKY\"  [facility] #:                  [id number]  :                       1950  ADMIT DATE:       2024 5:53 PM  DISCH DATE:  RESPONDING  PROVIDER #:        Tai Nava MD          QUERY TEXT:    Pt admitted with NSTEMI and per  Cardiology PN, has HF systolic   documented. Pt receiving home lisinopril and Coreg.  If possible, please   document in progress notes and discharge summary further specificity regarding   the type and acuity of systolic CHF:    The medical record reflects the following:  Risk Factors: 73 y.o. female with hx of COPD,  Clinical Indicators: Presented with SOB, Pro-BNP: 13,107, found to have   NSTEMI, per  LHC with LVEF: 25-30%  Treatment: home Lisinopril, Coreg, Cardiology consult, LHC  Options provided:  -- Chronic Systolic CHF/HFrEF  -- Other - I will add my own diagnosis  -- Disagree - Not applicable / Not valid  -- Disagree - Clinically unable to determine / Unknown  -- Refer to Clinical Documentation Reviewer    PROVIDER RESPONSE TEXT:    Provider disagreed with this query.    Query created by: Slick Jauregui on 2024 2:19 PM      Electronically signed by:  Tai Nava MD 2024 3:56 PM

## 2024-08-29 NOTE — ACP (ADVANCE CARE PLANNING)
Advance Care Planning   General Advance Care Planning (ACP) Conversation    Date of Conversation: 8/28/2024  Conducted with: Patient with Decision Making Capacity  Other persons present: None    Healthcare Decision Maker:  No healthcare decision makers have been documented.    Today we documented Decision Maker(s) consistent with Legal Next of Kin hierarchy.  Content/Action Overview:  Has NO ACP documents-Information provided  Reviewed DNR/DNI and patient elects Full Code (Attempt Resuscitation)      Length of Voluntary ACP Conversation in minutes:  <16 minutes (Non-Billable)    Lluvia Mishra RN

## 2024-08-29 NOTE — CONSULTS
[facility]   CONSULTATION  [phone]      Attending Physician: Ravi Hand MD  Reason for Consultation/Chief Complaint: Shortness of breath    Subjective   History of Present Illness:  Vero \"Kelly\" Augusto is a 73 y.o. patient who presented to the hospital with complaints of shortness of breath, this came on suddenly, patient was at a concert and began to experience shortness of breath, she was initially evaluated [facility], she was given breathing treatments and she says she has improved, she was transferred to [facility], troponin levels were elevated in the 500s, she was placed on heparin drip for possible non-STEMI.  She reports she has had remote history of A-fib that was a one-time event under.  Of stress that occurred in 2017 and she had no recurrence and as such did not have to follow-up with cardiologist.  She says she feels quite a bit better since admission although not quite back to baseline, she usually does not use home O2, she is currently on 2 L nasal cannula.  She denies chest pain, palpitations dizziness or loss of consciousness.    Past Medical History:   has a past medical history of COPD (chronic obstructive pulmonary disease) (HCC) and Streptococcus pneumoniae.    Surgical History:   has no past surgical history on file.     Social History:   reports that she has quit smoking. She has never used smokeless tobacco. She reports current alcohol use.     Family History:  family history includes Cancer in her mother.      Home Medications:  Were reviewed and are listed in nursing record and/or below  Prior to Admission medications    Medication Sig Start Date End Date Taking? Authorizing Provider   fluticasone-umeclidin-vilant (TRELEGY ELLIPTA) 200-62.5-25 MCG/ACT AEPB inhaler INHALE 1 PUFF ONCE DAILY 6/3/24  Yes Andi Serna MD   albuterol sulfate HFA (PROVENTIL;VENTOLIN;PROAIR) 108 (90 Base) MCG/ACT inhaler INHALE 2 PUFFS BY      Labs   CBC:   Lab Results   Component Value Date/Time    WBC 15.9 08/29/2024 02:50 AM    RBC 4.19 08/29/2024 02:50 AM    HGB 13.3 08/29/2024 02:50 AM    HCT 39.7 08/29/2024 02:50 AM    MCV 94.6 08/29/2024 02:50 AM    RDW 13.4 08/29/2024 02:50 AM     08/29/2024 02:50 AM     CMP:  Lab Results   Component Value Date/Time     08/29/2024 02:50 AM    K 4.1 08/29/2024 02:50 AM    CL 98 08/29/2024 02:50 AM    CO2 27 08/29/2024 02:50 AM    BUN 17 08/29/2024 02:50 AM    CREATININE 0.8 08/29/2024 02:50 AM    GFRAA >60 09/10/2021 10:47 AM    AGRATIO 1.3 06/03/2024 10:03 AM    LABGLOM 78 08/29/2024 02:50 AM    LABGLOM >60 05/24/2023 09:36 AM    GLUCOSE 126 08/29/2024 02:50 AM    CALCIUM 9.1 08/29/2024 02:50 AM    BILITOT <0.2 06/03/2024 10:03 AM    ALKPHOS 88 06/03/2024 10:03 AM    AST 10 06/03/2024 10:03 AM    ALT 8 06/03/2024 10:03 AM     PT/INR:  No results found for: \"PTINR\"  HgBA1c:  Lab Results   Component Value Date    LABA1C 5.6 05/24/2023     Lab Results   Component Value Date    CKTOTAL 961 (H) 10/23/2017    TROPONINI <0.01 10/24/2017         Cardiac Data     Last EKG: Normal sinus rhythm, precordial T wave inversions, no old EKG for comparison, there is an EKG from 2018 however this report is not visible for review      Studies:     Cxr  Reported clear at [facility]    I have reviewed labs and imaging/xray/diagnostic testing in this note.    Assessment and Plan          Patient Active Problem List   Diagnosis    COPD, severe (HCC)    Osteoporosis    Cataracts, both eyes    NSTEMI (non-ST elevated myocardial infarction) (HCC)    COPD exacerbation (HCC)       Possible non-STEMI, continue heparin drip, plan on echocardiogram and heart catheterization, risk/benefits/alternatives/outcomes discussed with patient, she understands/agrees and wishes to proceed in this manner.  Continue aspirin, no beta-blocker due to COPD    Hypercholesterolemia, continue statin    COPD, possible exacerbation

## 2024-08-29 NOTE — PROGRESS NOTES
Orders and additional information faxed to [employer] for life vest.  [employer] representative Juana Gorear [phone] notified of need for life vest.

## 2024-08-29 NOTE — PROCEDURES
CARDIAC CATHETERIZATION REPORT     Procedure Date:  2024  Patient Name: Vero \"April" Augusto  MRN: [id number] : 1950      INDICATION     nstemi    PROCEDURES PERFORMED     Left heart catheterization  LVgram  Coronary angiogam  Coronary cath  Monitoring of moderate conscious sedation        PROCEDURE DESCRIPTION   Immediately before procedure, sedation assessment was performed again and prior findings as per sedation note (see that note for details) are unchanged. Risks/benefits/alternatives/outcomes were discussed with patient and/or family and informed consent was obtained.  Using the Barbeau scale, the patient's right radial artery was found to be a level B.  Patient was prepped draped in the usual sterile fashion.  Local anaesthetic was applied over puncture site.  Using a front wall technique, a 4/5 Kenyan Terumo sheath was inserted into right radial artery.  Verapamil, nitroglycerin, cardne were administered through the sheath.  Heparin was administered.  Diagnostic 4fr pigtail, jl3.5m jr4 catheters were used for diagnostic angiograms.  At the conclusion of the procedure, a TR band was placed over the puncture site and hemostasis was obtained.  There were no immediate complications. I supervised sedation  during the procedure. An independent trained observer pushed meds at my direction.  We monitored the patient's level of consciousness and vital signs/physiologic status throughout the procedure duration (see cupid).  <20cc EBL.      FINDINGS         LVGRAM    LVEDP 14   GRADIENT ACROSS AORTIC VALVE No significant gradient   LV FUNCTION EF 25%   WALL MOTION Anteroapical and inferoapical hypokinesis   MITRAL REGURGITATION Mild         CORONARY ARTERIES    LM Less than 10% rcjhkruc-axq-ammuwa stenosis         LAD Small vessel in mid-distal segments, ostial/prox 30-40% stenosis, mid vessel bridging with 40-50% stenosis.  Distal <10% stenosis.          LCX Ostial/prox 30% stenosis,  mid-distal <10% stenosis       RCA Dominant, Less than 10% [id number]-ast-qwkzjz stenosis         CONCLUSIONS:     Nonobstructive cad/ashd  Findings c/w NICM (probable takosubo/stress CM)  Treat medically, add bblocker/ace, continue aspirin/statin  Life vest at dc and plan outpt cardiac MRI  Observe overnight and dc tomorrow    No further inpatient cardiac workup or treatment planned, will sign off, please call with questions.

## 2024-08-29 NOTE — PROGRESS NOTES
Hospital Medicine Progress Note      Date of Admission: 8/28/2024  Hospital Day: 2    Chief Admission Complaint:  SOB     Subjective:  Symptoms have improved. Angiogram completed    Telemetry (reviewed by me):  SR, no events    Presenting Admission History:       73 y.o. female who presented to [facility] with SOB.  PMHx significant for COPD. She reports symptoms started on 8/27 while at [place] for the [employer].  She had abrupt onset of SOB and MORSE. She was taken to the medical tent, provided some supplemental O2 and symptoms gradually improved. The next morning, she began to experience worsening symptoms again. She denies any chest pain but notes some discomfort in her left neck.  She was initially evaluated at [facility] ED. EKG was stable. CXR clear. Troponins (TnI) were elevated at 3. D-dimer and BNP elevated. WBC 19K. CTPE was negative for PE or Pneumonia but showed an 11.8 mm ANATOLY Pulmonary nodule and trace right pleural effusion, bronchiectasis but no definite infiltrate. There was concern for NSTEMI and possible COPD Exacerbation. She was started on Heparin drip, Levaquin, Solumedrol and Duonebs. She was transferred to [facility] for further evaluation.      Assessment/Plan:      NSTEMI: Symptoms somewhat atypical (SOB, MORSE, left neck pain) but Troponins are significantly elevated. Continue serial troponins. Treated with Heparin drip. Cardiology consulted. Angiogram 8/29 showed non-obstructive CAD; suspicion for non-ischemic Cardiomyopathy, possibly Takutsubo. Start medical management. May need LifeVest.      Possible COPD Exacerbation: Received dose of Levaquin and Solumedrol in ED. No evidence for Pneumonia. Continue Duonebs but suspicion low enough to hold off on further Abx and steroids.     Leukocytosis: 19K this morning at [facility]. Steroids were given afterwards. WBC appears normal at 10K upon transfer. Suspect this could be stress response.       Pulmonary Nodule: 11.8 mm ANATOLY

## 2024-08-30 LAB
ANION GAP SERPL CALCULATED.3IONS-SCNC: 8 MMOL/L (ref 3–16)
BUN SERPL-MCNC: 15 MG/DL (ref 7–20)
CALCIUM SERPL-MCNC: 8.8 MG/DL (ref 8.3–10.6)
CHLORIDE SERPL-SCNC: 101 MMOL/L (ref 99–110)
CO2 SERPL-SCNC: 28 MMOL/L (ref 21–32)
CREAT SERPL-MCNC: 0.8 MG/DL (ref 0.6–1.2)
DEPRECATED RDW RBC AUTO: 13.5 % (ref 12.4–15.4)
EKG DIAGNOSIS: NORMAL
EKG Q-T INTERVAL: 306 MS
EKG QRS DURATION: 84 MS
EKG QTC CALCULATION (BAZETT): 517 MS
EKG R AXIS: 87 DEGREES
EKG T AXIS: 252 DEGREES
EKG VENTRICULAR RATE: 172 BPM
GFR SERPLBLD CREATININE-BSD FMLA CKD-EPI: 78 ML/MIN/{1.73_M2}
GLUCOSE SERPL-MCNC: 85 MG/DL (ref 70–99)
HCT VFR BLD AUTO: 39.8 % (ref 36–48)
HGB BLD-MCNC: 12.8 G/DL (ref 12–16)
MCH RBC QN AUTO: 31.1 PG (ref 26–34)
MCHC RBC AUTO-ENTMCNC: 32.3 G/DL (ref 31–36)
MCV RBC AUTO: 96.2 FL (ref 80–100)
PLATELET # BLD AUTO: 238 K/UL (ref 135–450)
PMV BLD AUTO: 8.8 FL (ref 5–10.5)
POTASSIUM SERPL-SCNC: 4.1 MMOL/L (ref 3.5–5.1)
POTASSIUM SERPL-SCNC: 4.1 MMOL/L (ref 3.5–5.1)
RBC # BLD AUTO: 4.13 M/UL (ref 4–5.2)
SODIUM SERPL-SCNC: 137 MMOL/L (ref 136–145)
WBC # BLD AUTO: 12.4 K/UL (ref 4–11)

## 2024-08-30 PROCEDURE — 2580000003 HC RX 258: Performed by: INTERNAL MEDICINE

## 2024-08-30 PROCEDURE — 6370000000 HC RX 637 (ALT 250 FOR IP): Performed by: INTERNAL MEDICINE

## 2024-08-30 PROCEDURE — 93010 ELECTROCARDIOGRAM REPORT: CPT | Performed by: INTERNAL MEDICINE

## 2024-08-30 PROCEDURE — 94640 AIRWAY INHALATION TREATMENT: CPT

## 2024-08-30 PROCEDURE — 2060000000 HC ICU INTERMEDIATE R&B

## 2024-08-30 PROCEDURE — 2500000003 HC RX 250 WO HCPCS: Performed by: INTERNAL MEDICINE

## 2024-08-30 PROCEDURE — 93005 ELECTROCARDIOGRAM TRACING: CPT

## 2024-08-30 PROCEDURE — 80048 BASIC METABOLIC PNL TOTAL CA: CPT

## 2024-08-30 PROCEDURE — 85027 COMPLETE CBC AUTOMATED: CPT

## 2024-08-30 PROCEDURE — 2700000000 HC OXYGEN THERAPY PER DAY

## 2024-08-30 PROCEDURE — 94761 N-INVAS EAR/PLS OXIMETRY MLT: CPT

## 2024-08-30 PROCEDURE — 36415 COLL VENOUS BLD VENIPUNCTURE: CPT

## 2024-08-30 RX ORDER — LISINOPRIL 2.5 MG/1
2.5 TABLET ORAL DAILY
Qty: 30 TABLET | Refills: 0 | Status: SHIPPED | OUTPATIENT
Start: 2024-08-31

## 2024-08-30 RX ORDER — ASPIRIN 81 MG/1
81 TABLET ORAL DAILY
Qty: 30 TABLET | Refills: 0 | Status: SHIPPED | OUTPATIENT
Start: 2024-08-30

## 2024-08-30 RX ORDER — CARVEDILOL 3.12 MG/1
3.12 TABLET ORAL 2 TIMES DAILY WITH MEALS
Qty: 60 TABLET | Refills: 0 | Status: SHIPPED | OUTPATIENT
Start: 2024-08-30

## 2024-08-30 RX ADMIN — IPRATROPIUM BROMIDE AND ALBUTEROL SULFATE 1 DOSE: 2.5; .5 SOLUTION RESPIRATORY (INHALATION) at 08:21

## 2024-08-30 RX ADMIN — IPRATROPIUM BROMIDE AND ALBUTEROL SULFATE 1 DOSE: 2.5; .5 SOLUTION RESPIRATORY (INHALATION) at 20:31

## 2024-08-30 RX ADMIN — IPRATROPIUM BROMIDE AND ALBUTEROL SULFATE 1 DOSE: 2.5; .5 SOLUTION RESPIRATORY (INHALATION) at 16:01

## 2024-08-30 RX ADMIN — CARVEDILOL 3.12 MG: 3.12 TABLET, FILM COATED ORAL at 17:40

## 2024-08-30 RX ADMIN — ATORVASTATIN CALCIUM 80 MG: 80 TABLET, FILM COATED ORAL at 19:39

## 2024-08-30 RX ADMIN — IPRATROPIUM BROMIDE AND ALBUTEROL SULFATE 1 DOSE: 2.5; .5 SOLUTION RESPIRATORY (INHALATION) at 12:17

## 2024-08-30 RX ADMIN — SODIUM CHLORIDE 5 MG/HR: 900 INJECTION, SOLUTION INTRAVENOUS at 18:43

## 2024-08-30 RX ADMIN — ASPIRIN 81 MG 81 MG: 81 TABLET ORAL at 09:38

## 2024-08-30 RX ADMIN — Medication 10 ML: at 19:39

## 2024-08-30 NOTE — CARE COORDINATION
LOS 2.  Care managed by [facility]. Here w NSTEMI- S/P Cath. Plan Life Vest- rep has been notified. Currently 02 1L- poss new need. Plan [employer] Life Vest. From home w spouse. Yumiko Barnett RN     3433 Does not qualify for home 02 per walk test. Yumiko Barnett RN

## 2024-08-30 NOTE — DISCHARGE SUMMARY
Hospital Medicine Progress Note      Date of Admission: 8/28/2024  Hospital Day: 3    Chief Admission Complaint:  SOB     Subjective:  Symptoms have improved. Angiogram completed    Telemetry (reviewed by me):  SR, no events    Presenting Admission History:       73 y.o. female who presented to [facility] with SOB.  PMHx significant for COPD. She reports symptoms started on 8/27 while at [place] for the [employer].  She had abrupt onset of SOB and MORSE. She was taken to the medical tent, provided some supplemental O2 and symptoms gradually improved. The next morning, she began to experience worsening symptoms again. She denies any chest pain but notes some discomfort in her left neck.  She was initially evaluated at [facility] ED. EKG was stable. CXR clear. Troponins (TnI) were elevated at 3. D-dimer and BNP elevated. WBC 19K. CTPE was negative for PE or Pneumonia but showed an 11.8 mm ANATOLY Pulmonary nodule and trace right pleural effusion, bronchiectasis but no definite infiltrate. There was concern for NSTEMI and possible COPD Exacerbation. She was started on Heparin drip, Levaquin, Solumedrol and Duonebs. She was transferred to [facility] for further evaluation.      Assessment/Plan:      NSTEMI: Symptoms somewhat atypical (SOB, MORSE, left neck pain) but Troponins are significantly elevated. Continue serial troponins. Treated with Heparin drip. Cardiology consulted. Angiogram 8/29 showed non-obstructive CAD; suspicion for non-ischemic Cardiomyopathy, possibly Takutsubo. Start medical management. May need LifeVest.      Possible COPD Exacerbation: Received dose of Levaquin and Solumedrol in ED. No evidence for Pneumonia. Continue Duonebs but suspicion low enough to hold off on further Abx and steroids.     Leukocytosis: 19K this morning at [facility]. Steroids were given afterwards. WBC appears normal at 10K upon transfer. Suspect this could be stress response.       Pulmonary Nodule: 11.8 mm ANATOLY  Pulmonary nodule, will need ongoing follow-up.     Physical Exam Performed:      General appearance:  No apparent distress  Respiratory:  Normal respiratory effort.   Cardiovascular:  Regular rate and rhythm.  Abdomen:  Soft, non-tender, non-distended.  Musculoskelatal:  No edema  Neurologic:  Non-focal  Psychiatric:  Alert and oriented    BP (!) 90/52   Pulse 84   Temp 98.4 °F (36.9 °C) (Oral)   Resp 18   Ht 1.626 m (5' 4\")   Wt 46.6 kg (102 lb 12.8 oz)   SpO2 96%   BMI 17.65 kg/m²     Diet: ADULT DIET; Regular  DVT Prophylaxis: []PPx LMWH  []SQ Heparin  []IPC/SCDs  []Eliquis  []Xarelto  []Coumadin  [x] Heparin Drip  []Other -      Code status: Full Code  PT/OT Eval Status:   [x]NOT yet ordered  []Ordered and Pending   []Seen with Recommendations for:  []Home independently  []Home w/ assist  []HHC  []SNF  []Acute Rehab    Anticipated Discharge Day/Date:  1-2 days    Anticipated Discharge Location: [x]Home  []HHC  []SNF  []Acute Rehab  []ECF  []LTAC  []Hospice  []Other -      Consults:      IP CONSULT TO CARDIOLOGY  IP CONSULT TO PHARMACY      ------------------------------------------------------------------------------------------------------------------------------------------------------------------------    MDM    [x] High (any 2)    A. Problems (any 1)  [x] Acute/Chronic Illness/injury posing threat to life or bodily function:    [] Severe exacerbation of chronic illness:    ---------------------------------------------------------------------  B. Risk of Treatment (any 1)   [] Drugs/treatments that require intensive monitoring for toxicity include:    [] IV ABX requiring serial renal monitoring for nephrotoxicity:     [] IV Narcotic analgesia for adverse drug reaction  [] IV diuresis requiring serial monitoring for renal impairment and electrolyte derangements  [] Critical electrolyte abnormalities requiring IV replacement and close serial monitoring  [] Insulin - monitoring serial FSBS for

## 2024-08-30 NOTE — PROGRESS NOTES
O2 saturation at REST on ROOM AIR = ___97___%    If saturation is 89% or above please proceed with steps 2 and 3……….    O2 saturation with AMBULATION of ___120__ feet on ROOM AIR = __96___%  O2 saturation with AMBULATION on _______ liter/min = ______%    DCP notified: ___Yes___

## 2024-08-31 VITALS
WEIGHT: 101.9 LBS | SYSTOLIC BLOOD PRESSURE: 92 MMHG | HEART RATE: 69 BPM | RESPIRATION RATE: 18 BRPM | TEMPERATURE: 97.9 F | OXYGEN SATURATION: 97 % | DIASTOLIC BLOOD PRESSURE: 60 MMHG | BODY MASS INDEX: 17.4 KG/M2 | HEIGHT: 64 IN

## 2024-08-31 PROBLEM — I21.4 NSTEMI (NON-ST ELEVATED MYOCARDIAL INFARCTION) (HCC): Status: RESOLVED | Noted: 2024-08-28 | Resolved: 2024-08-31

## 2024-08-31 PROBLEM — J44.1 COPD EXACERBATION (HCC): Status: RESOLVED | Noted: 2024-08-28 | Resolved: 2024-08-31

## 2024-08-31 PROBLEM — R06.02 SHORTNESS OF BREATH: Status: ACTIVE | Noted: 2024-08-31

## 2024-08-31 PROBLEM — I42.8 NONISCHEMIC CARDIOMYOPATHY (HCC): Status: ACTIVE | Noted: 2024-08-31

## 2024-08-31 PROBLEM — R06.02 SHORTNESS OF BREATH: Status: RESOLVED | Noted: 2024-08-31 | Resolved: 2024-08-31

## 2024-08-31 LAB
ANION GAP SERPL CALCULATED.3IONS-SCNC: 9 MMOL/L (ref 3–16)
BUN SERPL-MCNC: 12 MG/DL (ref 7–20)
CALCIUM SERPL-MCNC: 8.8 MG/DL (ref 8.3–10.6)
CHLORIDE SERPL-SCNC: 100 MMOL/L (ref 99–110)
CO2 SERPL-SCNC: 27 MMOL/L (ref 21–32)
CREAT SERPL-MCNC: 0.7 MG/DL (ref 0.6–1.2)
GFR SERPLBLD CREATININE-BSD FMLA CKD-EPI: >90 ML/MIN/{1.73_M2}
GLUCOSE SERPL-MCNC: 103 MG/DL (ref 70–99)
POTASSIUM SERPL-SCNC: 3.6 MMOL/L (ref 3.5–5.1)
SODIUM SERPL-SCNC: 136 MMOL/L (ref 136–145)
TSH SERPL DL<=0.005 MIU/L-ACNC: 2.95 UIU/ML (ref 0.27–4.2)

## 2024-08-31 PROCEDURE — 2700000000 HC OXYGEN THERAPY PER DAY

## 2024-08-31 PROCEDURE — 80048 BASIC METABOLIC PNL TOTAL CA: CPT

## 2024-08-31 PROCEDURE — 6370000000 HC RX 637 (ALT 250 FOR IP): Performed by: INTERNAL MEDICINE

## 2024-08-31 PROCEDURE — 94640 AIRWAY INHALATION TREATMENT: CPT

## 2024-08-31 PROCEDURE — 6370000000 HC RX 637 (ALT 250 FOR IP): Performed by: NURSE PRACTITIONER

## 2024-08-31 PROCEDURE — 99233 SBSQ HOSP IP/OBS HIGH 50: CPT | Performed by: NURSE PRACTITIONER

## 2024-08-31 PROCEDURE — 36415 COLL VENOUS BLD VENIPUNCTURE: CPT

## 2024-08-31 PROCEDURE — 2500000003 HC RX 250 WO HCPCS: Performed by: INTERNAL MEDICINE

## 2024-08-31 PROCEDURE — 2580000003 HC RX 258: Performed by: INTERNAL MEDICINE

## 2024-08-31 PROCEDURE — 84443 ASSAY THYROID STIM HORMONE: CPT

## 2024-08-31 PROCEDURE — 94761 N-INVAS EAR/PLS OXIMETRY MLT: CPT

## 2024-08-31 RX ORDER — AMIODARONE HYDROCHLORIDE 200 MG/1
200 TABLET ORAL 2 TIMES DAILY
Status: DISCONTINUED | OUTPATIENT
Start: 2024-08-31 | End: 2024-08-31 | Stop reason: HOSPADM

## 2024-08-31 RX ORDER — AMIODARONE HYDROCHLORIDE 200 MG/1
TABLET ORAL
Qty: 118 TABLET | Refills: 1 | Status: SHIPPED | OUTPATIENT
Start: 2024-08-31 | End: 2024-12-13

## 2024-08-31 RX ADMIN — SODIUM CHLORIDE 10 MG/HR: 900 INJECTION, SOLUTION INTRAVENOUS at 04:53

## 2024-08-31 RX ADMIN — ASPIRIN 81 MG 81 MG: 81 TABLET ORAL at 08:40

## 2024-08-31 RX ADMIN — IPRATROPIUM BROMIDE AND ALBUTEROL SULFATE 1 DOSE: 2.5; .5 SOLUTION RESPIRATORY (INHALATION) at 11:25

## 2024-08-31 RX ADMIN — APIXABAN 5 MG: 5 TABLET, FILM COATED ORAL at 11:07

## 2024-08-31 RX ADMIN — CARVEDILOL 3.12 MG: 3.12 TABLET, FILM COATED ORAL at 08:40

## 2024-08-31 RX ADMIN — IPRATROPIUM BROMIDE AND ALBUTEROL SULFATE 1 DOSE: 2.5; .5 SOLUTION RESPIRATORY (INHALATION) at 07:35

## 2024-08-31 RX ADMIN — AMIODARONE HYDROCHLORIDE 200 MG: 200 TABLET ORAL at 11:07

## 2024-08-31 NOTE — PROGRESS NOTES
RN was called into patient's room due to life vest being activated multiple times. RN called Life vest support where support stated that the life vest was detecting -190s. RN got vital signs and called for a stat EKG due to rhythm changes. Patient stated that it felt like a flutter but no other complaints. Patient AO and agitated with life vest and ready to take it off and discharge. RN called a rapid response at 5:45 pm. Patient remained alert and oriented with elevated Hrs. Patient was transferred to .    Ita Moreira RN

## 2024-08-31 NOTE — PLAN OF CARE
Problem: Discharge Planning  Goal: Discharge to home or other facility with appropriate resources  8/31/2024 1031 by Gautam Rodriguez, RN  Outcome: Progressing     Problem: Safety - Adult  Goal: Free from fall injury  8/31/2024 1031 by Gautam Rodriguez, RN  Outcome: Progressing

## 2024-08-31 NOTE — DISCHARGE SUMMARY
Discharge Summary    Name:  Vero Casas /Age/Sex: 1950  (73 y.o. female)   MRN & CSN:  [id number] & [id number] Admission Date/Time: 2024  5:53 PM   Attending:  Kenia Guillen MD Discharging Physician: Kenia Guillen MD     Discharge diagnosis and plan:  HPI: 73 y.o. female who presented to [facility] with SOB. PMHx significant for COPD. She reports symptoms started on  while at [place] for the [employer]. She had abrupt onset of SOB and MORSE. She was taken to the medical tent, provided some supplemental O2 and symptoms gradually improved. The next morning, she began to experience worsening symptoms again. She denies any chest pain but notes some discomfort in her left neck. She was initially evaluated at [facility] ED. EKG was stable. CXR clear. Troponins (TnI) were elevated at 3. D-dimer and BNP elevated. WBC 19K. CTPE was negative for PE or Pneumonia but showed an 11.8 mm ANATOLY Pulmonary nodule and trace right pleural effusion, bronchiectasis but no definite infiltrate. There was concern for NSTEMI and possible COPD Exacerbation. She was started on Heparin drip, Levaquin, Solumedrol and Duonebs. She was transferred to [facility] for further evaluation.     NSTEMI, NICM, suspected Takutsubo/stress cardiomyopathy. LVEF 25-30% on echo this admission. Symptoms somewhat atypical (SOB, MORSE, left neck pain) but Troponins are significantly elevated. Serial troponins were measured. Initially kept on Heparin drip. Cardiology consulted. Angiogram  showed non-obstructive CAD; suspicion for non-ischemic Cardiomyopathy, possibly Takutsubo. Started medical management. LifeVest shocked patient for afib and so refused to wear it. Plan for 30 day event monitor. Plan to reassess LVEF in 3 months and if EF remains < 35%, consider ICD implant. Continue ASA, Lipitor, Coreg and lisinopril.     Afib RVR, converted to sinus. Start Eliquis 5 mg PO BID for stroke protection. If patient cannot afford  long term, can transition to Coumadin. Will start amiodarone 200 mg PO BID x 2 weeks then decrease to 200 mg PO daily. Will check baseline TSH. Office to arrange for follow up with RAH GORDON to discuss possible ablation.      Possible COPD Exacerbation: Received dose of Levaquin and Solumedrol in ED. No evidence for Pneumonia. Continue Duonebs but suspicion low enough to hold off on further Abx and steroids.     Leukocytosis: 19K this morning at [facility]. Steroids were given afterwards. WBC appears normal at 10K upon transfer. Suspect this could be stress response.       Pulmonary Nodule: 11.8 mm ANATOLY Pulmonary nodule, will need ongoing follow-up.    Patient was advised to stay overnight but refuses and adamant about discharging.       Discharge Exam  /66   Pulse 73   Temp 97.7 °F (36.5 °C) (Oral)   Resp 24   Ht 1.626 m (5' 4\")   Wt 46.2 kg (101 lb 14.4 oz)   SpO2 96%   BMI 17.49 kg/m²   Physical Exam  Constitutional:       General: She is not in acute distress.     Appearance: She is well-developed.   HENT:      Head: Normocephalic and atraumatic.      Right Ear: External ear normal.      Left Ear: External ear normal.      Nose: Nose normal.   Eyes:      General: No scleral icterus.        Right eye: No discharge.         Left eye: No discharge.      Conjunctiva/sclera: Conjunctivae normal.      Pupils: Pupils are equal, round, and reactive to light.   Neck:      Thyroid: No thyromegaly.      Vascular: No JVD.      Trachea: No tracheal deviation.   Cardiovascular:      Rate and Rhythm: Normal rate and regular rhythm.      Heart sounds: Normal heart sounds. No murmur heard.     No friction rub. No gallop.   Pulmonary:      Effort: Pulmonary effort is normal. No respiratory distress.      Breath sounds: Normal breath sounds. No stridor. No wheezing or rales.   Chest:      Chest wall: No tenderness.   Abdominal:      General: Bowel sounds are normal. There is no distension.      Palpations: Abdomen is  soft. There is no mass.      Tenderness: There is no abdominal tenderness. There is no guarding or rebound.      Hernia: No hernia is present.   Genitourinary:     Vagina: Normal.   Musculoskeletal:         General: No tenderness or deformity. Normal range of motion.      Cervical back: Normal range of motion and neck supple.   Lymphadenopathy:      Cervical: No cervical adenopathy.   Skin:     General: Skin is warm and dry.      Capillary Refill: Capillary refill takes less than 2 seconds.      Coloration: Skin is not pale.      Findings: No erythema or rash.   Neurological:      Mental Status: She is alert and oriented to person, place, and time.      Cranial Nerves: No cranial nerve deficit.      Motor: No abnormal muscle tone.      Coordination: Coordination normal.      Deep Tendon Reflexes: Reflexes normal.   Psychiatric:         Behavior: Behavior normal.         Thought Content: Thought content normal.         Judgment: Judgment normal.           Hospital Course:   Vero Casas is a 73 y.o.  female  who presents with NSTEMI (non-ST elevated myocardial infarction) (HCC)    -Please refer to discharge diagnosis and plan as mentioned above for details on hospital course.    The patient expressed appropriate understanding of and agreement with the discharge recommendations, medications, and plan.     Consults this admission:  IP CONSULT TO CARDIOLOGY  IP CONSULT TO PHARMACY      Discharge Instruction:   Handoff to PCP:   -  Follow up appointments: PCP  Primary care physician: -    Diet:  regular diet   Activity: activity as tolerated  Disposition: Discharged to:   [x]Home, []C, []SNF, []Acute Rehab, []Hospice   Condition on discharge: Stable    Discharge Medications:        Medication List        START taking these medications      amiodarone 200 MG tablet  Commonly known as: CORDARONE  Take 1 tablet by mouth 2 times daily for 14 days, THEN 1 tablet daily.  Start taking on: August 31, 2024

## 2024-08-31 NOTE — DISCHARGE SUMMARY
Pt d/c'd home.  Removed IV and stopped bleeding.  Catheter intact. Pt tolerated well. No redness noted at site.  Notified CMU and removed tele box. Reviewed d/c instructions, home meds, and  f/u information utilizing teach-back method.

## 2024-08-31 NOTE — PROGRESS NOTES
[facility]     Cardiology                                     Progress Note    Admission date:  2024    Reason for follow up visit: AF    HPI/CC: Vero Casas was admitted on 2024 with shortness of breath. Echo showed an EF of 25-30%. Troponin was elevated. She had LHC that showed nonobstructive CAD. On 2024 in the evening, she went into AF with very rapid rates. IV Cardizem was started. She spontaneously converted to SR this morning.  Rhythm has been sinus.     Subjective: She reports she was symptomatic with rapid AF. She has never felt anything like that in the past. Patient believes that it was the stress of the LifeVest that put her into an abnormal heart rhythm. She does not want to wear it as it is too heavy and bulky.      Vitals:  Blood pressure 104/66, pulse 73, temperature 97.7 °F (36.5 °C), temperature source Oral, resp. rate 24, height 1.626 m (5' 4\"), weight 46.2 kg (101 lb 14.4 oz), SpO2 96%.  Temp  Av.6 °F (36.4 °C)  Min: 97.3 °F (36.3 °C)  Max: 98.1 °F (36.7 °C)  Pulse  Av.5  Min: 70  Max: 168  BP  Min: 90/57  Max: 130/111  SpO2  Av.7 %  Min: 93 %  Max: 100 %    24 hour I/O    Intake/Output Summary (Last 24 hours) at 2024 0946  Last data filed at 2024  Gross per 24 hour   Intake 120 ml   Output --   Net 120 ml     Current Facility-Administered Medications   Medication Dose Route Frequency Provider Last Rate Last Admin    sodium chloride flush 0.9 % injection 5-40 mL  5-40 mL IntraVENous PRN Tai Nava MD        0.9 % sodium chloride infusion   IntraVENous PRN Tai Nava MD        acetaminophen (TYLENOL) tablet 650 mg  650 mg Oral Q4H PRN Tai Nava MD        lisinopril (PRINIVIL;ZESTRIL) tablet 2.5 mg  2.5 mg Oral Daily Tai Nava MD   2.5 mg at 24 1406    carvedilol (COREG) tablet 3.125 mg  3.125 mg Oral BID  Tai Nava MD   3.125 mg at 24 0840    sodium chloride flush 0.9 % injection     TROPONINI <0.01 10/24/2017 04:28 AM     PT/ INR   Lab Results   Component Value Date/Time    INR 1.31 10/23/2017 08:46 AM    PROTIME 14.8 10/23/2017 08:46 AM     PTT No components found for: \"PTT\"   Lab Results   Component Value Date/Time    MG 2.50 10/27/2017 06:15 AM      Lab Results   Component Value Date/Time    TSH 0.80 06/03/2024 10:03 AM       Assessment:  Paroxysmal symptomatic atrial fibrillation: RVR noted overnight    -[id number] score 3 (age, gender, EF)   NICM, suspected Takutsubo/stress cardiomyopathy    -EF 25-30% on echo this admission  Nonobstructive CAD on [facility] 8/30/2024  COPD       Plan:   1. Continue ASA, Lipitor, Coreg and lisinopril   2. Start Eliquis 5 mg PO BID for stroke protection. If patient cannot afford long term, can transition to Coumadin.   3. Given very rapid rates with depressed EF, patient will benefit from antiarrythmic medication. Options are limited given EF and prolonged QTc on EKG's. Will start amiodarone 200 mg PO BID x 2 weeks then decrease to 200 mg PO daily. Will check baseline TSH. Office to arrange for follow up with EP MD to discuss possible ablation.   4. Patient refusing LifeVest. Will plan to reassess LVEF in 3 months and if EF remains < 35%, consider ICD implant.   5. Thirty day event monitor   6. Would like to observe overnight, but patient would like to go home. This is reasonable. Discussed reasons to return to hospital.     Time Based Itemization  A total of 50 minutes was spent on today's patient encounter.  If applicable, non-patient-facing activities:  (*)Preparing to see the patient and reviewing records  (*) Individual interpretation of results  ( ) Discussion or coordination of care with other health care professionals  (*) Ordering of unique tests, medications, or procedures  (*) Documentation within the EHR        KELSEA Pugh-CNP  [facility]  [phone]

## 2024-09-03 ENCOUNTER — TELEPHONE (OUTPATIENT)
Dept: CARDIOLOGY CLINIC | Age: 74
End: 2024-09-03

## 2024-09-03 ENCOUNTER — CARE COORDINATION (OUTPATIENT)
Dept: CASE MANAGEMENT | Age: 74
End: 2024-09-03

## 2024-09-03 DIAGNOSIS — I42.8 NONISCHEMIC CARDIOMYOPATHY (HCC): Primary | ICD-10-CM

## 2024-09-03 PROCEDURE — 1111F DSCHRG MED/CURRENT MED MERGE: CPT | Performed by: INTERNAL MEDICINE

## 2024-09-03 NOTE — CARE COORDINATION
MD Cardiology [phone]    1/6/2025 1:00 PM Andi Serna MD Internal Medicine [phone]            Care Transition Nurse provided contact information.  Plan for follow-up call in 6-10 days based on severity of symptoms and risk factors.  Plan for next call: self management-       Juana Veliz RN

## 2024-09-03 NOTE — TELEPHONE ENCOUNTER
----- Message from KELSEA Pugh CNP sent at 8/31/2024 10:10 AM EDT -----  Please arrange for follow up with EP MD in 6-8 weeks. New patient. Thanks.

## 2024-09-06 ENCOUNTER — TELEPHONE (OUTPATIENT)
Dept: INTERNAL MEDICINE CLINIC | Age: 74
End: 2024-09-06

## 2024-09-06 ENCOUNTER — CARE COORDINATION (OUTPATIENT)
Dept: CASE MANAGEMENT | Age: 74
End: 2024-09-06

## 2024-09-06 NOTE — CARE COORDINATION
(RPM) program for in-home monitoring: Patient is not eligible for RPM program because: affiliate provider.    Assessments:  Care Transitions Subsequent and Final Call    Subsequent and Final Calls  Do you have any ongoing symptoms?: Yes  Onset of Patient-reported symptoms: Yesterday  Patient-reported symptoms: Rash  Interventions for patient-reported symptoms: Notified PCP/Physician  Have your medications changed?: No  Do you have any questions related to your medications?: No  Do you currently have any active services?: No  Do you have any needs or concerns that I can assist you with?: No  Identified Barriers: None  Care Transitions Interventions  Other Interventions:              Follow Up Appointment:   Reviewed upcoming appointment(s).  Future Appointments         Provider Specialty Dept Phone    9/9/2024 2:20 PM Andi Serna MD Internal Medicine [phone]    9/17/2024 2:30 PM Addi Billingsley APRN - CNP Cardiology [phone]    10/24/2024 12:15 PM HALIE Foster Jr., MD Cardiology [phone]    1/6/2025 1:00 PM Andi Serna MD Internal Medicine [phone]            Care Transition Nurse provided contact information.  Plan for follow-up call in 2-5 days based on severity of symptoms and risk factors.  Plan for next call: symptom management-see note      Juana Veliz RN

## 2024-09-06 NOTE — TELEPHONE ENCOUNTER
----- Message from GOLDEN PEREZ sent at 9/6/2024  4:57 PM EDT -----  Per Dr. Nickerson- can try benadryl  ----- Message -----  From: Bonnie Xiong  Sent: 9/6/2024   3:37 PM EDT  To: MD Dr. CHERIE Zapata pt- states she has been trying Curel lotion and bathing cloths, wants to know if there is anything else she can take or use until her appt on Monday. Please advise.    Patient stated that she has developed itching rash (little red bumps) on her chest, abdomen, and back area yesterday. Stated she has applied lotion, but not getting any relief. Itching is worsening today. Patient does not have any SOB, wheezing, or CP. Patient stated HR 70 and O2 sat 97% . Please call patient directly to [phone]

## 2024-09-09 ENCOUNTER — CARE COORDINATION (OUTPATIENT)
Dept: CASE MANAGEMENT | Age: 74
End: 2024-09-09

## 2024-09-09 ENCOUNTER — OFFICE VISIT (OUTPATIENT)
Dept: INTERNAL MEDICINE CLINIC | Age: 74
End: 2024-09-09

## 2024-09-09 ENCOUNTER — TELEPHONE (OUTPATIENT)
Dept: CARDIOLOGY CLINIC | Age: 74
End: 2024-09-09

## 2024-09-09 ENCOUNTER — ANCILLARY PROCEDURE (OUTPATIENT)
Dept: CARDIOLOGY CLINIC | Age: 74
End: 2024-09-09
Payer: MEDICARE

## 2024-09-09 VITALS
HEART RATE: 60 BPM | WEIGHT: 105 LBS | SYSTOLIC BLOOD PRESSURE: 132 MMHG | BODY MASS INDEX: 17.93 KG/M2 | HEIGHT: 64 IN | DIASTOLIC BLOOD PRESSURE: 76 MMHG

## 2024-09-09 DIAGNOSIS — J44.9 COPD, SEVERE (HCC): ICD-10-CM

## 2024-09-09 DIAGNOSIS — I50.22 CHRONIC SYSTOLIC (CONGESTIVE) HEART FAILURE (HCC): ICD-10-CM

## 2024-09-09 DIAGNOSIS — D68.69 SECONDARY HYPERCOAGULABLE STATE (HCC): ICD-10-CM

## 2024-09-09 DIAGNOSIS — Z09 HOSPITAL DISCHARGE FOLLOW-UP: ICD-10-CM

## 2024-09-09 DIAGNOSIS — I21.4 NSTEMI (NON-ST ELEVATED MYOCARDIAL INFARCTION) (HCC): Primary | ICD-10-CM

## 2024-09-09 DIAGNOSIS — I48.0 PAROXYSMAL ATRIAL FIBRILLATION (HCC): ICD-10-CM

## 2024-09-09 PROCEDURE — 99495 TRANSJ CARE MGMT MOD F2F 14D: CPT | Performed by: INTERNAL MEDICINE

## 2024-09-09 PROCEDURE — 1111F DSCHRG MED/CURRENT MED MERGE: CPT | Performed by: INTERNAL MEDICINE

## 2024-09-17 ENCOUNTER — OFFICE VISIT (OUTPATIENT)
Dept: CARDIOLOGY CLINIC | Age: 74
End: 2024-09-17
Payer: MEDICARE

## 2024-09-17 VITALS
DIASTOLIC BLOOD PRESSURE: 70 MMHG | HEIGHT: 64 IN | HEART RATE: 55 BPM | SYSTOLIC BLOOD PRESSURE: 112 MMHG | BODY MASS INDEX: 17.55 KG/M2 | WEIGHT: 102.8 LBS | OXYGEN SATURATION: 97 %

## 2024-09-17 DIAGNOSIS — I48.0 PAROXYSMAL ATRIAL FIBRILLATION (HCC): ICD-10-CM

## 2024-09-17 DIAGNOSIS — I51.81 TAKOTSUBO CARDIOMYOPATHY: Primary | ICD-10-CM

## 2024-09-17 PROCEDURE — 1123F ACP DISCUSS/DSCN MKR DOCD: CPT | Performed by: NURSE PRACTITIONER

## 2024-09-17 PROCEDURE — G8419 CALC BMI OUT NRM PARAM NOF/U: HCPCS | Performed by: NURSE PRACTITIONER

## 2024-09-17 PROCEDURE — 93000 ELECTROCARDIOGRAM COMPLETE: CPT | Performed by: NURSE PRACTITIONER

## 2024-09-17 PROCEDURE — G8399 PT W/DXA RESULTS DOCUMENT: HCPCS | Performed by: NURSE PRACTITIONER

## 2024-09-17 PROCEDURE — G8427 DOCREV CUR MEDS BY ELIG CLIN: HCPCS | Performed by: NURSE PRACTITIONER

## 2024-09-17 PROCEDURE — 1036F TOBACCO NON-USER: CPT | Performed by: NURSE PRACTITIONER

## 2024-09-17 PROCEDURE — 1090F PRES/ABSN URINE INCON ASSESS: CPT | Performed by: NURSE PRACTITIONER

## 2024-09-17 PROCEDURE — 99214 OFFICE O/P EST MOD 30 MIN: CPT | Performed by: NURSE PRACTITIONER

## 2024-09-17 PROCEDURE — 1111F DSCHRG MED/CURRENT MED MERGE: CPT | Performed by: NURSE PRACTITIONER

## 2024-09-17 PROCEDURE — 3017F COLORECTAL CA SCREEN DOC REV: CPT | Performed by: NURSE PRACTITIONER

## 2024-09-17 RX ORDER — SPIRONOLACTONE 25 MG/1
12.5 TABLET ORAL DAILY
Qty: 30 TABLET | Refills: 6 | Status: SHIPPED | OUTPATIENT
Start: 2024-09-17

## 2024-09-18 ENCOUNTER — CARE COORDINATION (OUTPATIENT)
Dept: CASE MANAGEMENT | Age: 74
End: 2024-09-18

## 2024-09-18 ASSESSMENT — ENCOUNTER SYMPTOMS
GASTROINTESTINAL NEGATIVE: 1
SHORTNESS OF BREATH: 1

## 2024-09-30 RX ORDER — CARVEDILOL 3.12 MG/1
3.12 TABLET ORAL 2 TIMES DAILY WITH MEALS
Qty: 60 TABLET | Refills: 3 | Status: SHIPPED | OUTPATIENT
Start: 2024-09-30

## 2024-09-30 RX ORDER — LISINOPRIL 2.5 MG/1
2.5 TABLET ORAL DAILY
Qty: 30 TABLET | Refills: 3 | Status: SHIPPED | OUTPATIENT
Start: 2024-09-30

## 2024-10-02 ENCOUNTER — CARE COORDINATION (OUTPATIENT)
Dept: CASE MANAGEMENT | Age: 74
End: 2024-10-02

## 2024-10-02 NOTE — CARE COORDINATION
Care Transitions Note    Final Call     Patient Current Location:  Home: [facility] [id number]    Care Transition Nurse contacted the patient by telephone. Verified name and  as identifiers.    Patient graduated from the Care Transitions program.  Patient/family verbalizes confidence in the ability to self-manage at this time. has the ability to self manage at this time..      Advance Care Planning:   Does patient have an Advance Directive: reviewed during previous call, see note. .    Handoff:   Patient was not referred to the ACM team due to no additional needs identified.       Care Summary Note: Patient answered call and verified . Patient pleasant and agreeable to transition call. Patient taking all medications as directed. Denied any CP or SOB. Continues to monitor BP and O2 sat on routine. BP is \"good\" and pulse ox is between 95-97%. Having some episodes of constipation and taking dulcolax as needed with good results. CTN reviewed lab orders and noted blood to be collected. Patient to have blood work collected tomorrow at [facility] lab. Denied any acute needs at present time. Stable and no further support needed.  Educated on the use of urgent care or physician’s 24 hr access line if assistance is needed after hours.    Assessments:  Care Transitions Subsequent and Final Call    Subsequent and Final Calls  Care Transitions Interventions  Other Interventions:              Upcoming Appointments:    Future Appointments         Provider Specialty Dept Phone    10/24/2024 12:15 PM HALIE Foster Jr., MD Cardiology [phone]    2024 2:45 PM Bharathi Blount MD Cardiology [phone]    2025 1:00 PM Andi Serna MD Internal Medicine [phone]            Patient has agreed to contact primary care provider and/or specialist for any further questions, concerns, or needs.    Juana Veliz RN

## 2024-10-03 ENCOUNTER — HOSPITAL ENCOUNTER (OUTPATIENT)
Age: 74
Discharge: HOME | End: 2024-10-03
Payer: MEDICARE

## 2024-10-03 DIAGNOSIS — I48.0 PAROXYSMAL ATRIAL FIBRILLATION (HCC): ICD-10-CM

## 2024-10-03 LAB
ANION GAP SERPL CALCULATED.3IONS-SCNC: 8 MMOL/L (ref 3–16)
BUN SERPL-MCNC: 10 MG/DL (ref 7–20)
CALCIUM SERPL-MCNC: 9.2 MG/DL (ref 8.3–10.6)
CHLORIDE SERPL-SCNC: 98 MMOL/L (ref 99–110)
CO2 SERPL-SCNC: 29 MMOL/L (ref 21–32)
CREAT SERPL-MCNC: 1.2 MG/DL (ref 0.6–1.2)
GFR SERPLBLD CREATININE-BSD FMLA CKD-EPI: 48 ML/MIN/{1.73_M2}
GLUCOSE SERPL-MCNC: 82 MG/DL (ref 70–99)
POTASSIUM SERPL-SCNC: 4.2 MMOL/L (ref 3.5–5.1)
SODIUM SERPL-SCNC: 135 MMOL/L (ref 136–145)

## 2024-10-03 PROCEDURE — 36415 COLL VENOUS BLD VENIPUNCTURE: CPT

## 2024-10-03 PROCEDURE — 80048 BASIC METABOLIC PNL TOTAL CA: CPT

## 2024-10-04 ENCOUNTER — TELEPHONE (OUTPATIENT)
Dept: CARDIOLOGY CLINIC | Age: 74
End: 2024-10-04

## 2024-10-04 NOTE — TELEPHONE ENCOUNTER
Covering for LRNP.   Can change the lisinopril to valsartan if she is concerned about the cough.   I would recommend that she continue the Spironolactone (aldactone) as this is for the heart function.

## 2024-10-04 NOTE — TELEPHONE ENCOUNTER
I spoke with patient regarding lab results.  And she c/o tickle in throat/cough with lisinopril and having constipation with spironolactone.  She has taken dulcolax and does not want to have to take this daily.

## 2024-10-07 NOTE — TELEPHONE ENCOUNTER
Spoke with patient and she VU and will continue on lisinopril and aldactone.  She notes that cough and dry mouth is getting better.

## 2024-10-17 PROCEDURE — 93228 REMOTE 30 DAY ECG REV/REPORT: CPT | Performed by: INTERNAL MEDICINE

## 2024-10-21 NOTE — TELEPHONE ENCOUNTER
Medication Refill    Medication needing refilled: apixaban (ELIQUIS) 5 MG TABS tablet [[id number]]        Dosage of the medication: 5mg    How are you taking this medication (QD, BID, TID, QID, PRN):   Sig: Take 1 tablet by mouth 2 times daily              30 or 90 day supply called in: 30    When will you run out of your medication: 1wk    Which Pharmacy are we sending the medication to?:   [facility] [address] [place] [address] -  [phone] -  [phone]  [address] [place] [id number]  Phone: [phone]  Fax: [phone]

## 2024-10-24 ENCOUNTER — OFFICE VISIT (OUTPATIENT)
Dept: CARDIOLOGY CLINIC | Age: 74
End: 2024-10-24

## 2024-10-24 VITALS
BODY MASS INDEX: 17.65 KG/M2 | OXYGEN SATURATION: 92 % | DIASTOLIC BLOOD PRESSURE: 78 MMHG | HEIGHT: 64 IN | WEIGHT: 103.4 LBS | HEART RATE: 44 BPM | SYSTOLIC BLOOD PRESSURE: 126 MMHG

## 2024-10-24 DIAGNOSIS — I42.8 NONISCHEMIC CARDIOMYOPATHY (HCC): ICD-10-CM

## 2024-10-24 DIAGNOSIS — I50.22 CHRONIC SYSTOLIC (CONGESTIVE) HEART FAILURE (HCC): ICD-10-CM

## 2024-10-24 DIAGNOSIS — I48.0 PAROXYSMAL ATRIAL FIBRILLATION (HCC): Primary | ICD-10-CM

## 2024-10-24 DIAGNOSIS — I42.9 CARDIOMYOPATHY, UNSPECIFIED TYPE (HCC): ICD-10-CM

## 2024-10-24 NOTE — PATIENT INSTRUCTIONS
RECOMMENDATIONS:  Discussed severely reduced heart function.   Discussed low heart rate on EKG today.   If no improvement in heart function, you would qualify for implantable pacemaker and/or defibrillator. This would prevent sudden cardiac death, given weakened heart function.   Update echo at beginning of December.   Discussed atrial fibrillation. Will discuss in further detail at next visit.  Continue current medications for now.   Start magnesium supplement.   Follow up in late December or early January with me.

## 2024-10-24 NOTE — PROGRESS NOTES
[facility]   Electrophysiology Consult Note              Date: 10/24/24  Patient Name: Vero Casas  YOB: 1950    Primary Care Physician: Andi Serna MD    CHIEF COMPLAINT:   Chief Complaint   Patient presents with    Follow-up     6-8 week follow up     Atrial Fibrillation     HISTORY OF PRESENT ILLNESS: Vero Casas is a 73 y.o. female who was admitted on 8/28/2024 with shortness of breath. Echo showed an EF of 25-30%. Troponin was elevated. She had LHC that showed nonobstructive CAD. On 8/30/2024 in the evening, she went into AF with very rapid rates. Event monitor at discharge showed no significant arrhythmias.    Today, 10/24/2024, EKG demonstrates SB 44 bpm. She reports that she is doing ok. She has SOB and has dizziness. She has a decrease in her appetite. She is taking her medications as prescribed. Denies recent issues with bleeding or bruising. Patient denies current edema, chest pain, palpitations, or syncope.     Past Medical History:   has a past medical history of COPD (chronic obstructive pulmonary disease) (HCC) and Streptococcus pneumoniae.    Past Surgical History:   has a past surgical history that includes Cardiac procedure (N/A, 8/29/2024).     Allergies:  Patient has no known allergies.    Social History:   reports that she has quit smoking. She has never used smokeless tobacco. She reports current alcohol use.     Family History: family history includes Cancer in her mother.    Home Medications:    Prior to Admission medications    Medication Sig Start Date End Date Taking? Authorizing Provider   apixaban (ELIQUIS) 5 MG TABS tablet Take 1 tablet by mouth 2 times daily 10/21/24  Yes Addi Billingsley APRN - CNP   lisinopril (PRINIVIL;ZESTRIL) 2.5 MG tablet Take 1 tablet by mouth daily 9/30/24  Yes Addi Billingsley APRN - CNP   carvedilol (COREG) 3.125 MG tablet Take 1 tablet by mouth 2 times daily (with meals) 9/30/24  Yes Addi Billingsley,

## 2024-10-25 RX ORDER — MAGNESIUM OXIDE 400 MG/1
400 TABLET ORAL DAILY
Qty: 30 TABLET | Refills: 3 | Status: SHIPPED | OUTPATIENT
Start: 2024-10-25

## 2024-11-13 ENCOUNTER — OFFICE VISIT (OUTPATIENT)
Dept: CARDIOLOGY CLINIC | Age: 74
End: 2024-11-13

## 2024-11-13 VITALS
OXYGEN SATURATION: 96 % | DIASTOLIC BLOOD PRESSURE: 58 MMHG | WEIGHT: 103.4 LBS | HEART RATE: 50 BPM | HEIGHT: 64 IN | SYSTOLIC BLOOD PRESSURE: 118 MMHG | BODY MASS INDEX: 17.65 KG/M2

## 2024-11-13 DIAGNOSIS — I42.8 NONISCHEMIC CARDIOMYOPATHY (HCC): Primary | ICD-10-CM

## 2024-11-13 DIAGNOSIS — R06.02 SHORTNESS OF BREATH: ICD-10-CM

## 2024-11-13 DIAGNOSIS — E78.2 MIXED HYPERLIPIDEMIA: ICD-10-CM

## 2024-11-13 DIAGNOSIS — I48.0 PAROXYSMAL ATRIAL FIBRILLATION (HCC): ICD-10-CM

## 2024-11-13 DIAGNOSIS — I25.10 CORONARY ARTERY DISEASE INVOLVING NATIVE CORONARY ARTERY OF NATIVE HEART WITHOUT ANGINA PECTORIS: ICD-10-CM

## 2024-11-13 DIAGNOSIS — I50.22 CHRONIC SYSTOLIC (CONGESTIVE) HEART FAILURE (HCC): ICD-10-CM

## 2024-11-13 NOTE — PROGRESS NOTES
Echocardiographic features are suggestive of Takotsubo cardiomyopathy.    Aortic Valve: Mild sclerosis of the aortic valve cusps.    Mitral Valve: Mildly thickened leaflets.    Image quality is suboptimal. Technically difficult study due to low parasternal window.     Echo 10/24/17  Summary   Patient is on mechanical ventilation.   Small size of left ventricle normal wall thickness and systolic function   with an estimated ejection fraction of 55%. No regional wall motion   abnormalities are seen.   Normal left ventricular diastolic filling pressure.   The right ventricle is normal in size and function.   No significant valvular heart disease.    Impression and Plan:      Nonischemic cardiomyopathy, suspected Takotsubo versus tachyarrhythmia related, with severe LV dysfunction  -EF 25-30% on echo 8/2024  -repeat echo pending December  -GDMT with carvedilol, lisinopril, spironolactone    HFrEF: Appears compensated, no Lasix  Nonobstructive coronary disease by angiogram 8/2024 - patient has deferred statin   Paroxysmal symptomatic atrial fibrillation: Stable, in sinus rhythm today              - [id number] score > 2 on Eliquis              -Diagnosed 8/2024    Hyperlipidemia, suboptimal, LDL 87, intolerant to statins per report  COPD  Former tobacco use    Patient Active Problem List   Diagnosis    COPD, severe (HCC)    Paroxysmal atrial fibrillation (HCC)    Osteoporosis    Cataracts, both eyes    Nonischemic cardiomyopathy (HCC)    Chronic systolic (congestive) heart failure    Secondary hypercoagulable state (HCC)       PLAN:  1.  Continue carvedilol/lisinopril/Aldactone for GDMT.  Though it is documented the patient was told to wean carvedilol she has continued to take and appears to be tolerating this albeit with low heart rate.  No significant presyncope or syncopal episodes.  I will continue.  We discussed starting Jardiance.  Patient defers at this time as she does not wish to take her medications and would

## 2024-12-02 RX ORDER — FLUTICASONE FUROATE, UMECLIDINIUM BROMIDE AND VILANTEROL TRIFENATATE 200; 62.5; 25 UG/1; UG/1; UG/1
POWDER RESPIRATORY (INHALATION)
Qty: 60 EACH | Refills: 0 | Status: SHIPPED | OUTPATIENT
Start: 2024-12-02

## 2024-12-11 ENCOUNTER — HOSPITAL ENCOUNTER (OUTPATIENT)
Age: 74
Discharge: HOME | End: 2024-12-13
Attending: INTERNAL MEDICINE
Payer: MEDICARE

## 2024-12-11 ENCOUNTER — TELEPHONE (OUTPATIENT)
Dept: INTERNAL MEDICINE CLINIC | Age: 74
End: 2024-12-11

## 2024-12-11 ENCOUNTER — TELEPHONE (OUTPATIENT)
Dept: CARDIOLOGY CLINIC | Age: 74
End: 2024-12-11

## 2024-12-11 VITALS
WEIGHT: 103 LBS | SYSTOLIC BLOOD PRESSURE: 135 MMHG | BODY MASS INDEX: 17.58 KG/M2 | HEIGHT: 64 IN | DIASTOLIC BLOOD PRESSURE: 77 MMHG

## 2024-12-11 DIAGNOSIS — I42.9 CARDIOMYOPATHY, UNSPECIFIED TYPE (HCC): ICD-10-CM

## 2024-12-11 DIAGNOSIS — I50.22 CHRONIC SYSTOLIC (CONGESTIVE) HEART FAILURE (HCC): ICD-10-CM

## 2024-12-11 LAB
ECHO AO ROOT DIAM: 2.8 CM
ECHO AO ROOT INDEX: 1.89 CM/M2
ECHO BSA: 1.45 M2
ECHO IVC EXP: 1.5 CM
ECHO IVC INSP: 0.7 CM
ECHO LV EF PHYSICIAN: 58 %
ECHO LV FRACTIONAL SHORTENING: 45 % (ref 28–44)
ECHO LV INTERNAL DIMENSION DIASTOLE INDEX: 2.97 CM/M2
ECHO LV INTERNAL DIMENSION DIASTOLIC: 4.4 CM (ref 3.9–5.3)
ECHO LV INTERNAL DIMENSION SYSTOLIC INDEX: 1.62 CM/M2
ECHO LV INTERNAL DIMENSION SYSTOLIC: 2.4 CM
ECHO LV IVSD: 0.9 CM (ref 0.6–0.9)
ECHO LV MASS 2D: 118.6 G (ref 67–162)
ECHO LV MASS INDEX 2D: 80.1 G/M2 (ref 43–95)
ECHO LV POSTERIOR WALL DIASTOLIC: 0.8 CM (ref 0.6–0.9)
ECHO LV RELATIVE WALL THICKNESS RATIO: 0.36
ECHO RV BASAL DIMENSION: 2.9 CM
ECHO RV FREE WALL PEAK S': 11.2 CM/S
ECHO RV LONGITUDINAL DIMENSION: 5.7 CM
ECHO RV MID DIMENSION: 2.2 CM

## 2024-12-11 PROCEDURE — 93308 TTE F-UP OR LMTD: CPT

## 2024-12-11 PROCEDURE — 93308 TTE F-UP OR LMTD: CPT | Performed by: INTERNAL MEDICINE

## 2024-12-11 PROCEDURE — 93325 DOPPLER ECHO COLOR FLOW MAPG: CPT | Performed by: INTERNAL MEDICINE

## 2024-12-11 PROCEDURE — 93321 DOPPLER ECHO F-UP/LMTD STD: CPT | Performed by: INTERNAL MEDICINE

## 2024-12-11 RX ORDER — AZITHROMYCIN 250 MG/1
TABLET, FILM COATED ORAL
Qty: 1 PACKET | Refills: 0 | Status: SHIPPED | OUTPATIENT
Start: 2024-12-11

## 2024-12-11 NOTE — TELEPHONE ENCOUNTER
Called pt and relayed echo results. Since echo showed normal heart function pt was wanting to know if she should keep her f/u with you in January. Pt states OV are hard on her financially, I did advise pt per last OV note that you did want to see her 6-8wks and NAV also advised in his last OV note to f/u with you as well.

## 2024-12-11 NOTE — TELEPHONE ENCOUNTER
----- Message from Dr. Andi Serna MD sent at 12/11/2024 12:21 PM EST -----  Contact: patient [phone]  zpack  ----- Message -----  From: Lillian Michael  Sent: 12/11/2024  11:04 AM EST  To: Andi Serna MD    Patient requesting Zpak for sinus infection.  Patient is COVID negative, congestion, sinus pressure, for several days now.  Patient states she has heart issues and so she didn't take anything over the counter.  Please advise        [facility] [address] [place] [address] [address] - P [phone] - F [phone]

## 2024-12-12 NOTE — TELEPHONE ENCOUNTER
Spoke with pt, relayed message from [facility]. Pt v/u.     Pt appt canceled for 01/07/2025    FRONT- per [facility]K okay to schedule for summer

## 2024-12-13 NOTE — TELEPHONE ENCOUNTER
Spoke to pt.  Stated that she did not want to rs [facility] appt at this time.  Will schedule when it gets closer to summer

## 2024-12-18 ENCOUNTER — OFFICE VISIT (OUTPATIENT)
Dept: CARDIOLOGY CLINIC | Age: 74
End: 2024-12-18
Payer: MEDICARE

## 2024-12-18 VITALS
HEIGHT: 64 IN | OXYGEN SATURATION: 97 % | SYSTOLIC BLOOD PRESSURE: 105 MMHG | WEIGHT: 101.4 LBS | DIASTOLIC BLOOD PRESSURE: 54 MMHG | HEART RATE: 51 BPM | BODY MASS INDEX: 17.31 KG/M2

## 2024-12-18 DIAGNOSIS — I48.0 PAROXYSMAL ATRIAL FIBRILLATION (HCC): ICD-10-CM

## 2024-12-18 DIAGNOSIS — I25.10 CORONARY ARTERY DISEASE INVOLVING NATIVE CORONARY ARTERY OF NATIVE HEART WITHOUT ANGINA PECTORIS: ICD-10-CM

## 2024-12-18 DIAGNOSIS — I42.8 NONISCHEMIC CARDIOMYOPATHY (HCC): Primary | ICD-10-CM

## 2024-12-18 PROCEDURE — G8427 DOCREV CUR MEDS BY ELIG CLIN: HCPCS | Performed by: INTERNAL MEDICINE

## 2024-12-18 PROCEDURE — 1090F PRES/ABSN URINE INCON ASSESS: CPT | Performed by: INTERNAL MEDICINE

## 2024-12-18 PROCEDURE — G8484 FLU IMMUNIZE NO ADMIN: HCPCS | Performed by: INTERNAL MEDICINE

## 2024-12-18 PROCEDURE — G8399 PT W/DXA RESULTS DOCUMENT: HCPCS | Performed by: INTERNAL MEDICINE

## 2024-12-18 PROCEDURE — 99214 OFFICE O/P EST MOD 30 MIN: CPT | Performed by: INTERNAL MEDICINE

## 2024-12-18 PROCEDURE — 3017F COLORECTAL CA SCREEN DOC REV: CPT | Performed by: INTERNAL MEDICINE

## 2024-12-18 PROCEDURE — 1123F ACP DISCUSS/DSCN MKR DOCD: CPT | Performed by: INTERNAL MEDICINE

## 2024-12-18 PROCEDURE — 1159F MED LIST DOCD IN RCRD: CPT | Performed by: INTERNAL MEDICINE

## 2024-12-18 PROCEDURE — 1036F TOBACCO NON-USER: CPT | Performed by: INTERNAL MEDICINE

## 2024-12-18 PROCEDURE — G8419 CALC BMI OUT NRM PARAM NOF/U: HCPCS | Performed by: INTERNAL MEDICINE

## 2024-12-18 NOTE — PROGRESS NOTES
CARDIOLOGY FOLLOW UP         Patient Name: Vero Casas  Primary Care physician: Andi Serna MD    Reason for Referral/Chief Complaint: Vero Casas is a 74 y.o. patient who presents to cardiology clinic today for evaluation and treatment of nonischemic/takotsubo cardiomyopathy and  non obstructive CAD.     History of Present Illness:   Vero Casas 74 y.o. female with a medical history notable for NICM/stress CM, non-obstructive coronary artery disease, COPD, and paroxysmal atrial fibrillation.     Patient was transferred from [facility] 8/2024 for shortness of breath and NSTEMI. Echocardiogram showed EF 25-30%. A [facility] 8/29/2024 showed nonobstructive CAD, probable Takotsubo cardiomyopathy. The next day she developed atrial fibrillation with RVR and converted with IV diltiazem.  Started on Eliquis and discharged with event monitor.   Results of monitor showed predominately SB, symptoms with NSR.   She followed up with NP Addi Billingsley 9/17/24.  Spironolactone 12.5 mg was added.   Follow up with EP, Dr. Foster 10/24/24.  Patient was to be weaned from carvedilol due to a slow heart rate which was possibly symptomatic.  Pacemaker discussed in event that she remained symptomatic, magnesium supplement started. Limited Echo ordered for December.     LOV 11/13/24 at which time she noted she had had a lot of stress in her life at time of event. With atrial fibrillation no palpitations but endorsed having a  \"wave\" like feeling in her heart at time of rhythm. Otherwise doing well. Did decline statin therapy.   In the interim limited Echo 12/11/24 showed EF 55-60%.    Today she presents with her , Alverto.  She reports feeling well other than fatigue at times.  States occasional dizziness with positional changes.  No syncope or falls.   States has had a cough with the lisinopril and would like to stop this medication. In general, feels she is taking too many medications and

## 2024-12-18 NOTE — PATIENT INSTRUCTIONS
Stop taking the Amiodarone  Stop taking the lisinopril   Continue all other medications as prescribed   No cardiac testing warranted at this time

## 2024-12-26 RX ORDER — CARVEDILOL 3.12 MG/1
3.12 TABLET ORAL 2 TIMES DAILY WITH MEALS
Qty: 180 TABLET | Refills: 1 | Status: SHIPPED | OUTPATIENT
Start: 2024-12-26

## 2024-12-26 NOTE — TELEPHONE ENCOUNTER
Medication Refill    Medication needing refilled:   ELIQUIS    Dosage of the medication:   5 MG    How are you taking this medication (QD, BID, TID, QID, PRN): BID    30 or 90 day supply called in: 90    NEXT OV 7.7.25  LOV NAV 12/18/2024  NOV 7/7/25  EKG 10/24/24

## 2024-12-26 NOTE — TELEPHONE ENCOUNTER
NAV OOT      Last Office Visit: 11/13/2024 Provider: NAV  **Is provider OOT? Yes    Next Office Visit: 07/07/2025Provider: NAV  **If no OV, when does pt need to be seen?   **Has patient already had 30 day supply? No    Lab orders needed? N/A  Encounter provider correct? Yes If not, change provider  Script changes since last refill? no    LAST LABS:   BMP:   Lab Results   Component Value Date/Time     10/03/2024 03:08 PM    K 4.2 10/03/2024 03:08 PM    K 3.6 08/31/2024 04:28 AM    CL 98 10/03/2024 03:08 PM    CO2 29 10/03/2024 03:08 PM    BUN 10 10/03/2024 03:08 PM    CREATININE 1.2 10/03/2024 03:08 PM    GLUCOSE 82 10/03/2024 03:08 PM    CALCIUM 9.2 10/03/2024 03:08 PM    LABGLOM 48 10/03/2024 03:08 PM    LABGLOM >60 05/24/2023 09:36 AM         **Care Everywhere? N/A

## 2024-12-30 RX ORDER — FLUTICASONE FUROATE, UMECLIDINIUM BROMIDE AND VILANTEROL TRIFENATATE 200; 62.5; 25 UG/1; UG/1; UG/1
POWDER RESPIRATORY (INHALATION)
Qty: 60 EACH | Refills: 0 | Status: SHIPPED | OUTPATIENT
Start: 2024-12-30

## 2025-01-27 RX ORDER — FLUTICASONE FUROATE, UMECLIDINIUM BROMIDE AND VILANTEROL TRIFENATATE 200; 62.5; 25 UG/1; UG/1; UG/1
POWDER RESPIRATORY (INHALATION)
Qty: 60 EACH | Refills: 2 | Status: SHIPPED | OUTPATIENT
Start: 2025-01-27

## 2025-02-03 ENCOUNTER — OFFICE VISIT (OUTPATIENT)
Dept: INTERNAL MEDICINE CLINIC | Age: 75
End: 2025-02-03

## 2025-02-03 VITALS
HEIGHT: 64 IN | BODY MASS INDEX: 17.42 KG/M2 | SYSTOLIC BLOOD PRESSURE: 138 MMHG | WEIGHT: 102 LBS | DIASTOLIC BLOOD PRESSURE: 70 MMHG | RESPIRATION RATE: 16 BRPM | HEART RATE: 60 BPM

## 2025-02-03 DIAGNOSIS — I48.0 PAROXYSMAL ATRIAL FIBRILLATION (HCC): ICD-10-CM

## 2025-02-03 DIAGNOSIS — M81.0 OSTEOPOROSIS, UNSPECIFIED OSTEOPOROSIS TYPE, UNSPECIFIED PATHOLOGICAL FRACTURE PRESENCE: ICD-10-CM

## 2025-02-03 DIAGNOSIS — J44.9 COPD, SEVERE (HCC): ICD-10-CM

## 2025-02-03 DIAGNOSIS — I21.4 NSTEMI (NON-ST ELEVATED MYOCARDIAL INFARCTION) (HCC): Primary | ICD-10-CM

## 2025-02-03 DIAGNOSIS — Z23 NEED FOR INFLUENZA VACCINATION: ICD-10-CM

## 2025-02-03 DIAGNOSIS — D68.69 SECONDARY HYPERCOAGULABLE STATE (HCC): ICD-10-CM

## 2025-02-03 DIAGNOSIS — I50.22 CHRONIC SYSTOLIC (CONGESTIVE) HEART FAILURE (HCC): ICD-10-CM

## 2025-02-03 PROCEDURE — 3017F COLORECTAL CA SCREEN DOC REV: CPT | Performed by: NURSE PRACTITIONER

## 2025-02-03 PROCEDURE — 1090F PRES/ABSN URINE INCON ASSESS: CPT | Performed by: NURSE PRACTITIONER

## 2025-02-03 PROCEDURE — 1036F TOBACCO NON-USER: CPT | Performed by: NURSE PRACTITIONER

## 2025-02-03 PROCEDURE — 90662 IIV NO PRSV INCREASED AG IM: CPT | Performed by: NURSE PRACTITIONER

## 2025-02-03 PROCEDURE — 3023F SPIROM DOC REV: CPT | Performed by: NURSE PRACTITIONER

## 2025-02-03 PROCEDURE — 1123F ACP DISCUSS/DSCN MKR DOCD: CPT | Performed by: NURSE PRACTITIONER

## 2025-02-03 PROCEDURE — G8427 DOCREV CUR MEDS BY ELIG CLIN: HCPCS | Performed by: NURSE PRACTITIONER

## 2025-02-03 PROCEDURE — 1160F RVW MEDS BY RX/DR IN RCRD: CPT | Performed by: NURSE PRACTITIONER

## 2025-02-03 PROCEDURE — 1159F MED LIST DOCD IN RCRD: CPT | Performed by: NURSE PRACTITIONER

## 2025-02-03 PROCEDURE — G8419 CALC BMI OUT NRM PARAM NOF/U: HCPCS | Performed by: NURSE PRACTITIONER

## 2025-02-03 PROCEDURE — G0008 ADMIN INFLUENZA VIRUS VAC: HCPCS | Performed by: NURSE PRACTITIONER

## 2025-02-03 PROCEDURE — 99214 OFFICE O/P EST MOD 30 MIN: CPT | Performed by: NURSE PRACTITIONER

## 2025-02-03 PROCEDURE — G8399 PT W/DXA RESULTS DOCUMENT: HCPCS | Performed by: NURSE PRACTITIONER

## 2025-02-03 ASSESSMENT — PATIENT HEALTH QUESTIONNAIRE - PHQ9
SUM OF ALL RESPONSES TO PHQ QUESTIONS 1-9: 0
2. FEELING DOWN, DEPRESSED OR HOPELESS: NOT AT ALL
1. LITTLE INTEREST OR PLEASURE IN DOING THINGS: NOT AT ALL
SUM OF ALL RESPONSES TO PHQ9 QUESTIONS 1 & 2: 0
SUM OF ALL RESPONSES TO PHQ QUESTIONS 1-9: 0

## 2025-02-03 NOTE — PROGRESS NOTES
thickness. Apical akinesis. Global longitudinal strain is reduced with a value of -9.1%. Grade I diastolic dysfunction with normal LAP. Echocardiographic features are suggestive of Takotsubo cardiomyopathy.    Aortic Valve: Mild sclerosis of the aortic valve cusps.    Mitral Valve: Mildly thickened leaflets.    Image quality is suboptimal. Technically difficult study due to low parasternal window.         An electronic signature was used to authenticate this note.    --Desirae De La Cruz, KELSEA - CNP

## 2025-04-25 RX ORDER — FLUTICASONE FUROATE, UMECLIDINIUM BROMIDE AND VILANTEROL TRIFENATATE 200; 62.5; 25 UG/1; UG/1; UG/1
POWDER RESPIRATORY (INHALATION)
Qty: 60 EACH | Refills: 1 | Status: SHIPPED | OUTPATIENT
Start: 2025-04-25

## 2025-05-12 RX ORDER — ALBUTEROL SULFATE 90 UG/1
2 INHALANT RESPIRATORY (INHALATION) EVERY 6 HOURS PRN
Qty: 9 G | Refills: 0 | Status: SHIPPED | OUTPATIENT
Start: 2025-05-12

## 2025-06-02 RX ORDER — ALBUTEROL SULFATE 90 UG/1
2 INHALANT RESPIRATORY (INHALATION) EVERY 6 HOURS PRN
Qty: 9 G | Refills: 0 | Status: SHIPPED | OUTPATIENT
Start: 2025-06-02

## 2025-06-18 RX ORDER — CARVEDILOL 3.12 MG/1
3.12 TABLET ORAL 2 TIMES DAILY WITH MEALS
Qty: 180 TABLET | Refills: 1 | Status: SHIPPED | OUTPATIENT
Start: 2025-06-18

## 2025-06-18 NOTE — TELEPHONE ENCOUNTER
Last Office Visit: 12/18/2024 Provider: NAV  **Is provider OOT? No    Next Office Visit: 07/07/2025   Provider: NAV    LAST LABS:   BMP:  Lab Results   Component Value Date/Time     10/03/2024 03:08 PM    K 4.2 10/03/2024 03:08 PM    K 3.6 08/31/2024 04:28 AM    CL 98 10/03/2024 03:08 PM    CO2 29 10/03/2024 03:08 PM    BUN 10 10/03/2024 03:08 PM    CREATININE 1.2 10/03/2024 03:08 PM    GLUCOSE 82 10/03/2024 03:08 PM    CALCIUM 9.2 10/03/2024 03:08 PM    LABGLOM 48 10/03/2024 03:08 PM    LABGLOM >60 05/24/2023 09:36 AM      Lab orders needed? no

## 2025-06-24 ENCOUNTER — RESULTS FOLLOW-UP (OUTPATIENT)
Dept: INTERNAL MEDICINE CLINIC | Age: 75
End: 2025-06-24

## 2025-06-24 ENCOUNTER — HOSPITAL ENCOUNTER (OUTPATIENT)
Dept: MAMMOGRAPHY | Age: 75
Discharge: HOME | End: 2025-06-24
Payer: MEDICARE

## 2025-06-24 DIAGNOSIS — Z12.39 SCREENING BREAST EXAMINATION: ICD-10-CM

## 2025-06-24 DIAGNOSIS — R92.8 ABNORMAL MAMMOGRAM: Primary | ICD-10-CM

## 2025-06-24 PROCEDURE — 77063 BREAST TOMOSYNTHESIS BI: CPT

## 2025-06-24 RX ORDER — APIXABAN 5 MG/1
5 TABLET, FILM COATED ORAL 2 TIMES DAILY
Qty: 60 TABLET | Refills: 0 | Status: SHIPPED | OUTPATIENT
Start: 2025-06-24

## 2025-06-24 RX ORDER — FLUTICASONE FUROATE, UMECLIDINIUM BROMIDE AND VILANTEROL TRIFENATATE 200; 62.5; 25 UG/1; UG/1; UG/1
POWDER RESPIRATORY (INHALATION)
Qty: 60 EACH | Refills: 0 | Status: SHIPPED | OUTPATIENT
Start: 2025-06-24

## 2025-06-24 NOTE — TELEPHONE ENCOUNTER
Last Office Visit: 12/18/2024 Provider: NAV  **Is provider OOT? No    Next Office Visit: 7/7/25 Provider: NAV    **Has patient already had 30 day supply? No    Lab orders needed? no   Encounter provider correct? Yes If not, change provider  Script changes since last refill? no    LAST LABS:   CBC:  Lab Results   Component Value Date    WBC 12.4 (H) 08/30/2024    HGB 12.8 08/30/2024    HCT 39.8 08/30/2024    MCV 96.2 08/30/2024     08/30/2024    LYMPHOPCT 20.2 06/03/2024    RBC 4.13 08/30/2024    MCH 31.1 08/30/2024    MCHC 32.3 08/30/2024    RDW 13.5 08/30/2024           CMP:  Lab Results   Component Value Date     (L) 10/03/2024    K 4.2 10/03/2024    CL 98 (L) 10/03/2024    CO2 29 10/03/2024    BUN 10 10/03/2024    CREATININE 1.2 10/03/2024    GLUCOSE 82 10/03/2024    CALCIUM 9.2 10/03/2024    BILITOT <0.2 06/03/2024    ALKPHOS 88 06/03/2024    AST 10 (L) 06/03/2024    ALT 8 (L) 06/03/2024    LABGLOM 48 (A) 10/03/2024    GFRAA >60 09/10/2021    AGRATIO 1.3 06/03/2024    GLOB 2.8 09/10/2021         Last 3 Lipids:  Lab Results   Component Value Date    CHOL 183 06/03/2024    CHOL 217 (H) 05/24/2023    CHOL 210 (H) 09/10/2021     Lab Results   Component Value Date    TRIG 174 (H) 06/03/2024    TRIG 89 05/24/2023    TRIG 83 09/10/2021     Lab Results   Component Value Date    HDL 61 (H) 06/03/2024    HDL 71 (H) 05/24/2023    HDL 69 (H) 09/10/2021     Lab Results   Component Value Date    LDL 87 06/03/2024     (H) 05/24/2023     (H) 09/10/2021     Lab Results   Component Value Date    VLDL 35 06/03/2024    VLDL 18 05/24/2023    VLDL 17 09/10/2021

## 2025-07-07 ENCOUNTER — OFFICE VISIT (OUTPATIENT)
Dept: CARDIOLOGY CLINIC | Age: 75
End: 2025-07-07
Payer: MEDICARE

## 2025-07-07 VITALS
DIASTOLIC BLOOD PRESSURE: 68 MMHG | SYSTOLIC BLOOD PRESSURE: 100 MMHG | BODY MASS INDEX: 16.9 KG/M2 | OXYGEN SATURATION: 96 % | HEART RATE: 56 BPM | WEIGHT: 99 LBS | HEIGHT: 64 IN

## 2025-07-07 DIAGNOSIS — I25.10 CORONARY ARTERY DISEASE INVOLVING NATIVE CORONARY ARTERY OF NATIVE HEART WITHOUT ANGINA PECTORIS: ICD-10-CM

## 2025-07-07 DIAGNOSIS — E78.2 MIXED HYPERLIPIDEMIA: ICD-10-CM

## 2025-07-07 DIAGNOSIS — I48.0 PAROXYSMAL ATRIAL FIBRILLATION (HCC): ICD-10-CM

## 2025-07-07 DIAGNOSIS — I51.81 STRESS-INDUCED CARDIOMYOPATHY: Primary | ICD-10-CM

## 2025-07-07 PROCEDURE — 99214 OFFICE O/P EST MOD 30 MIN: CPT | Performed by: INTERNAL MEDICINE

## 2025-07-07 PROCEDURE — G8427 DOCREV CUR MEDS BY ELIG CLIN: HCPCS | Performed by: INTERNAL MEDICINE

## 2025-07-07 PROCEDURE — 1159F MED LIST DOCD IN RCRD: CPT | Performed by: INTERNAL MEDICINE

## 2025-07-07 PROCEDURE — G2211 COMPLEX E/M VISIT ADD ON: HCPCS | Performed by: INTERNAL MEDICINE

## 2025-07-07 PROCEDURE — G8419 CALC BMI OUT NRM PARAM NOF/U: HCPCS | Performed by: INTERNAL MEDICINE

## 2025-07-07 PROCEDURE — 3017F COLORECTAL CA SCREEN DOC REV: CPT | Performed by: INTERNAL MEDICINE

## 2025-07-07 PROCEDURE — 1123F ACP DISCUSS/DSCN MKR DOCD: CPT | Performed by: INTERNAL MEDICINE

## 2025-07-07 PROCEDURE — G8399 PT W/DXA RESULTS DOCUMENT: HCPCS | Performed by: INTERNAL MEDICINE

## 2025-07-07 PROCEDURE — 1036F TOBACCO NON-USER: CPT | Performed by: INTERNAL MEDICINE

## 2025-07-07 PROCEDURE — 1090F PRES/ABSN URINE INCON ASSESS: CPT | Performed by: INTERNAL MEDICINE

## 2025-07-07 RX ORDER — SPIRONOLACTONE 25 MG/1
12.5 TABLET ORAL DAILY
Qty: 45 TABLET | Refills: 6 | Status: SHIPPED | OUTPATIENT
Start: 2025-07-07

## 2025-07-07 NOTE — PATIENT INSTRUCTIONS
May stop the Aspirin August 29, 2025.    Continue all other medications as prescribed.  Refills given

## 2025-07-07 NOTE — PROGRESS NOTES
Medium Adult   Pulse: 56   SpO2: 96%   Weight: 44.9 kg (99 lb)   Height: 1.626 m (5' 4\")     PHYSICAL EXAM:    General:  Pleasant woman, no acute distress   Head:  Normocephalic, atraumatic   Eyes:  Conjunctiva/corneas clear, anicteric sclerae    Nose: Nares normal, no drainage or sinus tenderness   Throat: No abnormalities of the lips, oral mucosa or tongue.    Neck: Trachea midline. Neck supple    Lungs:   Right-sided wheezing, left lung fields clear   Chest Wall:  No deformity or tenderness to palpation   Heart:  Regular rate and rhythm, normal S1, normal S2, no murmur, no rub, no S3/S4    Abdomen:   Soft, non-tender, with normoactive bowel sounds.    Extremities: No cyanosis, clubbing or pitting edema.  Warm and well-perfused   Vascular: 2+ radial, 2+ posterior tibial pulses bilaterally. Brisk carotid upstrokes without carotid bruit.    Skin: Skin color, texture, turgor are normal with no rashes or ulceration.    Pysch: Euthymic mood, appropriate affect   Neurologic: Oriented to person, place and time.  No deficits        Labs:   CBC:   Lab Results   Component Value Date/Time    WBC 12.4 08/30/2024 07:20 AM    RBC 4.13 08/30/2024 07:20 AM    HGB 12.8 08/30/2024 07:20 AM    HCT 39.8 08/30/2024 07:20 AM    MCV 96.2 08/30/2024 07:20 AM    RDW 13.5 08/30/2024 07:20 AM     08/30/2024 07:20 AM     CMP:  Lab Results   Component Value Date/Time     10/03/2024 03:08 PM    K 4.2 10/03/2024 03:08 PM    K 3.6 08/31/2024 04:28 AM    CL 98 10/03/2024 03:08 PM    CO2 29 10/03/2024 03:08 PM    BUN 10 10/03/2024 03:08 PM    CREATININE 1.2 10/03/2024 03:08 PM    GFRAA >60 09/10/2021 10:47 AM    AGRATIO 1.3 06/03/2024 10:03 AM    LABGLOM 48 10/03/2024 03:08 PM    LABGLOM >60 05/24/2023 09:36 AM    GLUCOSE 82 10/03/2024 03:08 PM    CALCIUM 9.2 10/03/2024 03:08 PM    BILITOT <0.2 06/03/2024 10:03 AM    ALKPHOS 88 06/03/2024 10:03 AM    AST 10 06/03/2024 10:03 AM    ALT 8 06/03/2024 10:03 AM     PT/INR:  No results found

## 2025-07-09 ENCOUNTER — HOSPITAL ENCOUNTER (OUTPATIENT)
Dept: ULTRASOUND IMAGING | Age: 75
Discharge: HOME | End: 2025-07-09
Attending: INTERNAL MEDICINE
Payer: MEDICARE

## 2025-07-09 ENCOUNTER — HOSPITAL ENCOUNTER (OUTPATIENT)
Dept: MAMMOGRAPHY | Age: 75
Discharge: HOME | End: 2025-07-09
Payer: MEDICARE

## 2025-07-09 DIAGNOSIS — R92.8 ABNORMAL MAMMOGRAM: ICD-10-CM

## 2025-07-09 PROCEDURE — G0279 TOMOSYNTHESIS, MAMMO: HCPCS

## 2025-07-09 PROCEDURE — 76642 ULTRASOUND BREAST LIMITED: CPT

## 2025-07-28 RX ORDER — FLUTICASONE FUROATE, UMECLIDINIUM BROMIDE AND VILANTEROL TRIFENATATE 200; 62.5; 25 UG/1; UG/1; UG/1
POWDER RESPIRATORY (INHALATION)
Qty: 60 EACH | Refills: 0 | Status: SHIPPED | OUTPATIENT
Start: 2025-07-28

## 2025-08-04 ENCOUNTER — OFFICE VISIT (OUTPATIENT)
Dept: INTERNAL MEDICINE CLINIC | Age: 75
End: 2025-08-04

## 2025-08-04 VITALS
HEIGHT: 64 IN | HEART RATE: 68 BPM | WEIGHT: 97 LBS | BODY MASS INDEX: 16.56 KG/M2 | SYSTOLIC BLOOD PRESSURE: 88 MMHG | DIASTOLIC BLOOD PRESSURE: 62 MMHG

## 2025-08-04 DIAGNOSIS — I48.0 PAROXYSMAL ATRIAL FIBRILLATION (HCC): ICD-10-CM

## 2025-08-04 DIAGNOSIS — J44.9 COPD, SEVERE (HCC): Primary | ICD-10-CM

## 2025-08-04 DIAGNOSIS — I50.22 CHRONIC SYSTOLIC (CONGESTIVE) HEART FAILURE (HCC): ICD-10-CM

## 2025-08-04 DIAGNOSIS — D68.69 SECONDARY HYPERCOAGULABLE STATE: ICD-10-CM

## 2025-08-04 PROCEDURE — G8419 CALC BMI OUT NRM PARAM NOF/U: HCPCS | Performed by: INTERNAL MEDICINE

## 2025-08-04 PROCEDURE — 1159F MED LIST DOCD IN RCRD: CPT | Performed by: INTERNAL MEDICINE

## 2025-08-04 PROCEDURE — G8399 PT W/DXA RESULTS DOCUMENT: HCPCS | Performed by: INTERNAL MEDICINE

## 2025-08-04 PROCEDURE — 1090F PRES/ABSN URINE INCON ASSESS: CPT | Performed by: INTERNAL MEDICINE

## 2025-08-04 PROCEDURE — 99214 OFFICE O/P EST MOD 30 MIN: CPT | Performed by: INTERNAL MEDICINE

## 2025-08-04 PROCEDURE — G8427 DOCREV CUR MEDS BY ELIG CLIN: HCPCS | Performed by: INTERNAL MEDICINE

## 2025-08-04 PROCEDURE — 3017F COLORECTAL CA SCREEN DOC REV: CPT | Performed by: INTERNAL MEDICINE

## 2025-08-04 PROCEDURE — 1123F ACP DISCUSS/DSCN MKR DOCD: CPT | Performed by: INTERNAL MEDICINE

## 2025-08-04 PROCEDURE — 1036F TOBACCO NON-USER: CPT | Performed by: INTERNAL MEDICINE

## 2025-08-04 PROCEDURE — 3023F SPIROM DOC REV: CPT | Performed by: INTERNAL MEDICINE

## 2025-08-04 PROCEDURE — 1160F RVW MEDS BY RX/DR IN RCRD: CPT | Performed by: INTERNAL MEDICINE

## 2025-08-04 RX ORDER — ALBUTEROL SULFATE 90 UG/1
2 INHALANT RESPIRATORY (INHALATION) EVERY 6 HOURS PRN
Qty: 9 G | Refills: 0 | Status: SHIPPED | OUTPATIENT
Start: 2025-08-04

## 2025-08-04 ASSESSMENT — ENCOUNTER SYMPTOMS
WHEEZING: 0
DIARRHEA: 0
CHEST TIGHTNESS: 0
COUGH: 0
ABDOMINAL PAIN: 0
NAUSEA: 0
BLOOD IN STOOL: 0
SHORTNESS OF BREATH: 0
VOMITING: 0

## 2025-08-25 RX ORDER — FLUTICASONE FUROATE, UMECLIDINIUM BROMIDE AND VILANTEROL TRIFENATATE 200; 62.5; 25 UG/1; UG/1; UG/1
POWDER RESPIRATORY (INHALATION)
Qty: 60 EACH | Refills: 2 | Status: SHIPPED | OUTPATIENT
Start: 2025-08-25

## (undated) DEVICE — GLIDESHEATH SLENDER STAINLESS STEEL KIT: Brand: GLIDESHEATH SLENDER

## (undated) DEVICE — CATH LAB PACK: Brand: MEDLINE INDUSTRIES, INC.

## (undated) DEVICE — TR BAND RADIAL ARTERY COMPRESSION DEVICE: Brand: TR BAND

## (undated) DEVICE — 260 CM J TIP WIRE .035

## (undated) DEVICE — CORDIS 4F ANGLED PIG 145 MOD

## (undated) DEVICE — COVIDIEN  WHOLEY GUIDE WIRE

## (undated) DEVICE — RADPAD

## (undated) DEVICE — CORDIS 4FR JL3.5 CATHETER

## (undated) DEVICE — CORDIS 4FR JR4 CATHETER